# Patient Record
(demographics unavailable — no encounter records)

---

## 2022-02-22 NOTE — CAT SCAN REPORT
CT HEAD WITHOUT CONTRAST



INDICATION : CODE STROKE CALL REPORT [phone] Altered mental status, rightward gaze, focal seizu.




TECHNIQUE:  Axial imaging performed from the skull apex through the skull base without the use of con
trast.  Sagittal and coronal reformatted images.  All CT scans at this location are performed using C
T dose reduction for ALARA by means of automated exposure control. 



COMPARISON:  None



FINDINGS:  



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 There is an approximate 2 x 6
 x 2 x 2 cm acute hemorrhage surrounding the ventricular catheter in the left frontal lobe with surro
unding edema. No other areas of hemorrhage are identified. No convincing acute ischemic infarct. Mild
 nonspecific chronic white matter changes are noted. No chronic infarct.

Ventricles:  Ventricles are normal in size and appear symmetric.   

Bones:  No acute osseous abnormality.   

Sinuses:  Sinuses and mastoid air cells are clear.



Soft tissues:  There is mild soft tissue swelling and hemorrhagic products in the left frontal soft t
issue surrounding the ventriculoperitoneal shunt.  



IMPRESSION: Acute left frontal hemorrhage as described surrounding the left frontal ventricular bacilio
ter.





============================================

CODE STROKE:

Time of Communication (CST/CDT): 1240 hours

Licensed Practitioner Receiving Report: Dr. Bauer received this information for Dr. Gaming who was in
 care of the patient.







============================================











CTA NECK



INDICATION / CLINICAL INFORMATION:

50 years Male; CODE STROKE CALL REPORT [phone] Altered mental status, rightward gaze, focal seiz
u.



TECHNIQUE: Thin cut axial images obtained through the head during IV bolus contrast administration. S
agittal, coronal, and 3 plane MIP reconstructions performed by the technologist. NASCET type criteria
 used evaluate stenoses. All CT scans at this location are performed using CT dose reduction for ALAR
A by means of automated exposure control.



COMPARISON:

None available.



FINDINGS: 



ARCH: Mild atherosclerotic disease.



CAROTID ARTERIES: The common carotid arteries and internal carotid arteries are tortuous. There are m
oderate to severe calcific plaques in the right carotid bulb extending to the proximal right ICA. Mod
erate to severe stenosis is suspected in the proximal right ICA. The remainder of the right ICA is wi
jamarcus patent. There are mild to moderate atherosclerotic plaques in the left carotid bulb with no evid
ence for stenosis in the left carotid system.



VERTEBRAL ARTERIES: Codominant vertebral system seen. No significant stenosis appreciated.



ADDITIONAL FINDINGS: Remainder of the surrounding soft tissues are grossly normal. 



IMPRESSION: Moderate calcific plaques are identified at both carotid bifurcations, more so on the rig
ht side. Hemodynamically significant stenosis is suspected in the proximal right ICA and estimated at
 70-80%. The level of stenosis is difficult to measure due to extensive tortuosity of the vessels. Co
nsider correlation with carotid Doppler ultrasound.







CTA HEAD



INDICATION / CLINICAL INFORMATION:

50 years Male; CODE STROKE CALL REPORT [phone] Altered mental status, rightward gaze, focal seiz
u.



TECHNIQUE: Thin cut axial images obtained through the head during IV bolus contrast administration. S
agittal, coronal, and 3 plane MIP reconstructions performed by the technologist. NASCET type criteria
 used evaluate stenoses. Automated exposure control utilized for radiation reduction purposes.



COMPARISON: 

None available.



FINDINGS:



INTERNAL CAROTID ARTERIES: Mild to moderate atherosclerotic plaques are noted in the distal ICAs but 
no hemodynamically significant stenosis.

VERTEBROBASILAR SYSTEM: No significant narrowing appreciated.



DISTAL BRANCHES: Distal branches of the anterior, middle, and posterior cerebral arteries are fairly 
symmetric in appearance and number. There appears to be previous surgical clipping or coiling in the 
right sylvian fissure which may represent previous aneurysm repair. Please correlate with history.



ANEURYSM: None identified.



ADDITIONAL FINDINGS: Remainder of the surrounding soft tissues are grossly normal. 



IMPRESSION:

No evidence for stenosis or large vessel occlusion. 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
lvian fissure.



Signer Name: Yasmany Carney Jr, MD 

Signed: 2/22/2022 1:47 PM

Workstation Name: [id number]

## 2022-02-22 NOTE — EMERGENCY DEPARTMENT REPORT
HPI





- General


Chief Complaint: Neuro Symptoms/Deficit


Time Seen by Provider: 22 12:28





- HPI


HPI: 





Room 25








The patient is a 50-year-old male present with a chief complaint of altered 

mental status/seizure.  Per EMS the patient was at dialysis when he was found to

be hypertensive.  At 11: 55 the patient stopped responding and had a rightward 

gaze.  Upon EMS arrival the patient was found to have a focal seizure of the 

right face with a rightward gaze.  Per EMS the patient has never responded 

verbally to them.  Upon arrival to the ED the patient localizes with his left 

hand when given a sternal rub but he does not speak as he continues to seize.  

Patient was administered 2 mg of Ativan and within 1 to 2 minutes the seizure 

abated.





ED Past Medical Hx





- Past Medical History


Hx Hypertension: Yes


Hx Kidney Stones: Yes (ESRD)





- Surgical History


Additional Surgical History: Left upper extremity fistula





- Family History


Family history: no significant





- Social History


Smoking Status: Unknown if ever smoked


Substance Use Type: None





ED Review of Systems


ROS: 


Stated complaint: STROKE


Other details as noted in HPI





Comment: Unobtainable due to pts medical conditions





Physical Exam





- Physical Exam


Vital Signs: 


                                   Vital Signs











  22





  12:28


 


Pulse Rate 71


 


Respiratory 18





Rate 


 


Blood Pressure 205/132





[Left] 


 


O2 Sat by Pulse 98





Oximetry 











Physical Exam: 





GENERAL: The patient is well-developed well-nourished male sitting up on 

stretcher exhibiting rightward gaze with twitching of the right side of his 

face. []


HEENT: Normocephalic.  Atraumatic.  Rightward gaze, twitching to the right side 

of the face


NECK: Supple.  Trachea midline


CHEST/LUNGS: Clear to auscultation.  There is no respiratory distress noted.


HEART/CARDIOVASCULAR: Regular.  There is no tachycardia.  There is no gallop rub

 or murmur.


ABDOMEN: Abdomen is soft, nontender.  Patient has normal bowel sounds.  There is

 no abdominal distention.


SKIN: There is no rash.  There is no edema.  There is no diaphoresis.


NEURO: The patient is having a focal seizure of the right face.  Patient does 

not respond verbally but localizes with the left hand when given a sternal rub.


MUSCULOSKELETAL:  There is no evidence of acute injury.





ED Course


                                   Vital Signs











  22





  12:28


 


Pulse Rate 71


 


Respiratory 18





Rate 


 


Blood Pressure 205/132





[Left] 


 


O2 Sat by Pulse 98





Oximetry 














- Reevaluation(s)


Reevaluation #1: 





22 13:50


GCS 8





- Consultations


Consultation #1: 


Case discussed with tele-neurologist-we will follow up after CT


CT discussed with tele-neurologist-recommends Keppra 2 g and transfer to 

hospital with a recent intracranial surgery was performed





22 13:13


[facility] transfer line called


22 13:51


Case discussed with [facility] neuro intensivist Dr. Rodríguez- will accept patient in

 transfer to [facility].  Recommends administering DDAVP 0.3 mcg/kg and keeping 

systolic blood pressure less than 150.


22 14:29








- Intubation


Sedative: Etomidate


Mg Given: 20


Paralytic: Succinylcholine


Mg Given: 100


Laryngoscope: fiberoptic video scope


Size: 3


Assist Device Used: fiberoptic device


ET Tube Size: 8


Tube Secured Depth (cm): 24


Tube Secured Location: lips


Tube Placement Confirmation: visualized tube passing t, equal breath sounds bila

t, no breath sounds over epi, confirmation by capnometr


Patient Tolerated Procedure: no complications


Intubation Complications: none





ED Medical Decision Making





- Lab Data


Result diagrams: 


                                 22 12:43





                                 22 12:43





                                Laboratory Tests











  22





  12:43 12:43 12:43


 


WBC  8.0  


 


RBC  3.78  


 


Hgb  11.1 L  


 


Hct  33.7 L  


 


MCV  89  


 


MCH  30  


 


MCHC  33  


 


RDW  17.9 H  


 


Plt Count  162  


 


Lymph % (Auto)  4.6 L  


 


Mono % (Auto)  10.9 H  


 


Eos % (Auto)  6.9 H  


 


Baso % (Auto)  2.0 H  


 


Lymph # (Auto)  0.4 L  


 


Mono # (Auto)  0.9 H  


 


Eos # (Auto)  0.6 H  


 


Baso # (Auto)  0.2 H  


 


Seg Neutrophils %  75.6 H  


 


Seg Neutrophils #  6.1  


 


PT    15.1 H


 


INR    1.07


 


APTT    33.9


 


Sodium   139 


 


Potassium   4.6 


 


Chloride   96.3 L 


 


Carbon Dioxide   19 L 


 


Anion Gap   28 


 


BUN   79 H 


 


Creatinine   14.6 H 


 


Estimated GFR   4 


 


BUN/Creatinine Ratio   5 


 


Glucose   121 H 


 


Calcium   9.1 


 


Magnesium   2.30 














- Radiology Data


Radiology results: report reviewed (CT head, CTA brain, CTA neck), image 

reviewed (CT head, CTA brain, CTA neck)





[facility] [address] [place] [address] Cat

 Scan Report Signed Patient: LETICIA TERRELL MR#: [id number] : 1971 

Acct:[id number] Age/Sex: 50 / M ADM Date: 22 Loc: ED Attending Dr: 

Ordering Physician: PILAR JASON MD Date of Service: 22 Procedure(s): CT 

head/brain wo con Accession Number(s): [id number] cc: PILAR JASON MD CT HEAD 

WITHOUT CONTRAST INDICATION : CODE STROKE CALL REPORT [phone] Altered 

mental status, rightward gaze, focal seizu. TECHNIQUE: Axial imaging performed 

from the skull apex through the skull base without the use of contrast. Sagittal

 and coronal reformatted images. All CT scans at this location are performed 

using CT dose reduction for ALARA by means of automated exposure control. 

COMPARISON: None FINDINGS: Parenchyma: A left frontal ventricular peritoneal 

shunt is identified. There is an approximate 2 x 6 x 2 x 2 cm acute hemorrhage 

surrounding the ventricular catheter in the left frontal lobe with surrounding 

edema. No other areas of hemorrhage are identified. No convincing acute ischemic

 infarct. Mild nonspecific chronic white matter changes are noted. No chronic 

infarct. Ventricles: Ventricles are normal in size and appear symmetric. Bones: 

No acute osseous abnormality. Sinuses: Sinuses and mastoid air cells are clear. 

Soft tissues: There is mild soft tissue swelling and hemorrhagic products in the

 left frontal soft tissue surrounding the ventriculoperitoneal shunt. 

IMPRESSION: Acute left frontal hemorrhage as described surrounding the left 

frontal ventricular catheter. ============================================ CODE 

STROKE: Time of Communication (CST/CDT): 1240 hours Licensed Practitioner 

Receiving Report: Dr. Bauer received this information for Dr. Jason who was in 

care of the patient. ============================================ CTA NECK 

INDICATION / CLINICAL INFORMATION: 50 years Male; CODE STROKE CALL REPORT 

[phone] Altered mental status, rightward gaze, focal seizu. TECHNIQUE: Thin

 cut axial images obtained through the head during IV bolus contrast 

administration. Sagittal, coronal, and 3 plane MIP reconstructions performed by 

the technologist. NASCET type criteria used evaluate stenoses. All CT scans at 

this location are performed using CT dose reduction for ALARA by means of 

automated exposure control. COMPARISON: None available. FINDINGS: ARCH: Mild 

atherosclerotic disease. CAROTID ARTERIES: The common carotid arteries and 

internal carotid arteries are tortuous. There are moderate to severe calcific 

plaques in the right carotid bulb extending to the proximal right ICA. Moderate 

to severe stenosis is suspected in the proximal right ICA. The remainder of the 

right ICA is widely patent. There are mild to moderate atherosclerotic plaques 

in the left carotid bulb with no evidence for stenosis in the left carotid 

system. VERTEBRAL ARTERIES: Codominant vertebral system seen. No significant 

stenosis appreciated. ADDITIONAL FINDINGS: Remainder of the surrounding soft 

tissues are grossly normal. IMPRESSION: Moderate calcific plaques are identified

 at both carotid bifurcations, more so on the right side. Hemodynamically 

significant stenosis is suspected in the proximal right ICA and estimated at 70-

80%. The level of stenosis is difficult to measure due to extensive tortuosity 

of the vessels. Consider correlation with carotid Doppler ultrasound. CTA HEAD 

INDICATION / CLINICAL INFORMATION: 50 years Male; CODE STROKE CALL REPORT 

[phone] Altered mental status, rightward gaze, focal seizu. TECHNIQUE: Thin

 cut axial images obtained through the head during IV bolus contrast 

administration. Sagittal, coronal, and 3 plane MIP reconstructions performed by 

the technologist. NASCET type criteria used evaluate stenoses. Automated 

exposure control utilized for radiation reduction purposes. COMPARISON: None 

available. FINDINGS: INTERNAL CAROTID ARTERIES: Mild to moderate atherosclerotic

 plaques are noted in the distal ICAs but no hemodynamically significant 

stenosis. VERTEBROBASILAR SYSTEM: No significant narrowing appreciated. DISTAL 

BRANCHES: Distal branches of the anterior, middle, and posterior cerebral 

arteries are fairly symmetric in appearance and number. There appears to be 

previous surgical clipping or coiling in the right sylvian fissure which may 

represent previous aneurysm repair. Please correlate with history. ANEURYSM: 

None identified. ADDITIONAL FINDINGS: Remainder of the surrounding soft tissues 

are grossly normal. IMPRESSION: No evidence for stenosis or large vessel 

occlusion. 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 

Signer Name: Yasmany Carney Jr, MD Signed: 2022 1:47 PM Workstation Name:

 [id number] Transcribed By: TTR Dictated By: YASMANY CARNEY JR, MD 

Electronically Authenticated By: YASMANY CARNEY JR, MD Signed Date/Time: 

22 1347 DD/DT: 22 1330 TD/TT: 


Print Cancel 





[facility] [address] [place] [address] Cat

 Scan Report Signed Patient: LETICIA TERRELL MR#: [id number] : 1971 

Acct:[id number] Age/Sex: 50 / M ADM Date: 22 Loc: ED Attending Dr: 

Ordering Physician: PILAR JASON MD Date of Service: 22 Procedure(s): CT 

angio head Accession Number(s): [id number] cc: PILAR JASON MD CT HEAD WITHOUT 

CONTRAST INDICATION : CODE STROKE CALL REPORT [phone] Altered mental stat

us, rightward gaze, focal seizu. TECHNIQUE: Axial imaging performed from the 

skull apex through the skull base without the use of contrast. Sagittal and 

coronal reformatted images. All CT scans at this location are performed using CT

 dose reduction for ALARA by means of automated exposure control. COMPARISON: 

None FINDINGS: Parenchyma: A left frontal ventricular peritoneal shunt is 

identified. There is an approximate 2 x 6 x 2 x 2 cm acute hemorrhage 

surrounding the ventricular catheter in the left frontal lobe with surrounding 

edema. No other areas of hemorrhage are identified. No convincing acute ischemic

 infarct. Mild nonspecific chronic white matter changes are noted. No chronic 

infarct. Ventricles: Ventricles are normal in size and appear symmetric. Bones: 

No acute osseous abnormality. Sinuses: Sinuses and mastoid air cells are clear. 

Soft tissues: There is mild soft tissue swelling and hemorrhagic products in the

 left frontal soft tissue surrounding the ventriculoperitoneal shunt. 

IMPRESSION: Acute left frontal hemorrhage as described surrounding the left 

frontal ventricular catheter. ============================================ CODE 

STROKE: Time of Communication (CST/CDT): 1240 hours Licensed Practitioner 

Receiving Report: Dr. Bauer received this information for Dr. Jason who was in 

care of the patient. ============================================ CTA NECK 

INDICATION / CLINICAL INFORMATION: 50 years Male; CODE STROKE CALL REPORT 

[phone] Altered mental status, rightward gaze, focal seizu. TECHNIQUE: Thin

 cut axial images obtained through the head during IV bolus contrast 

administration. Sagittal, coronal, and 3 plane MIP reconstructions performed by 

the technologist. NASCET type criteria used evaluate stenoses. All CT scans at 

this location are performed using CT dose reduction for ALARA by means of 

automated exposure control. COMPARISON: None available. FINDINGS: ARCH: Mild 

atherosclerotic disease. CAROTID ARTERIES: The common carotid arteries and inter

nal carotid arteries are tortuous. There are moderate to severe calcific plaques

 in the right carotid bulb extending to the proximal right ICA. Moderate to 

severe stenosis is suspected in the proximal right ICA. The remainder of the 

right ICA is widely patent. There are mild to moderate atherosclerotic plaques 

in the left carotid bulb with no evidence for stenosis in the left carotid 

system. VERTEBRAL ARTERIES: Codominant vertebral system seen. No significant 

stenosis appreciated. ADDITIONAL FINDINGS: Remainder of the surrounding soft 

tissues are grossly normal. IMPRESSION: Moderate calcific plaques are identified

 at both carotid bifurcations, more so on the right side. Hemodynamically signi

ficant stenosis is suspected in the proximal right ICA and estimated at 70-80%. 

The level of stenosis is difficult to measure due to extensive tortuosity of the

 vessels. Consider correlation with carotid Doppler ultrasound. CTA HEAD 

INDICATION / CLINICAL INFORMATION: 50 years Male; CODE STROKE CALL REPORT 

[phone] Altered mental status, rightward gaze, focal seizu. TECHNIQUE: Thin

 cut axial images obtained through the head during IV bolus contrast 

administration. Sagittal, coronal, and 3 plane MIP reconstructions performed by 

the technologist. NASCET type criteria used evaluate stenoses. Automated 

exposure control utilized for radiation reduction purposes. COMPARISON: None 

available. FINDINGS: INTERNAL CAROTID ARTERIES: Mild to moderate atherosclerotic

 plaques are noted in the distal ICAs but no hemodynamically significant 

stenosis. VERTEBROBASILAR SYSTEM: No significant narrowing appreciated. DISTAL 

BRANCHES: Distal branches of the anterior, middle, and posterior cerebral 

arteries are fairly symmetric in appearance and number. There appears to be 

previous surgical clipping or coiling in the right sylvian fissure which may 

represent previous aneurysm repair. Please correlate with history. ANEURYSM: 

None identified. ADDITIONAL FINDINGS: Remainder of the surrounding soft tissues 

are grossly normal. IMPRESSION: No evidence for stenosis or large vessel 

occlusion. 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 

Signer Name: Yasmany Carney Jr, MD Signed: 2022 1:47 PM Workstation Name:

 [id number] Transcribed By: TTR Dictated By: YASMANY CARNEY JR, MD 

Electronically Authenticated By: YASMANY CARNEY JR, MD Signed Date/Time: 

22 1347 DD/DT: 22 1330 TD/TT: 


Print Cancel 





[facility] [address] [place] [address] Cat

 Scan Report Signed Patient: LETICIA TERRELL MR#: [id number] : 1971 

Acct:[id number] Age/Sex: 50 / M ADM Date: 22 Loc: ED Attending Dr: Manohar goode Physician: PILAR JASON MD Date of Service: 22 Procedure(s): CT 

angio neck Accession Number(s): [id number] cc: PILAR JASON MD CT HEAD WITHOUT 

CONTRAST INDICATION : CODE STROKE CALL REPORT [phone] Altered mental 

status, rightward gaze, focal seizu. TECHNIQUE: Axial imaging performed from the

 skull apex through the skull base without the use of contrast. Sagittal and 

coronal reformatted images. All CT scans at this location are performed using CT

 dose reduction for ALARA by means of automated exposure control. COMPARISON: 

None FINDINGS: Parenchyma: A left frontal ventricular peritoneal shunt is 

identified. There is an approximate 2 x 6 x 2 x 2 cm acute hemorrhage 

surrounding the ventricular catheter in the left frontal lobe with surrounding 

edema. No other areas of hemorrhage are identified. No convincing acute ischemic

 infarct. Mild nonspecific chronic white matter changes are noted. No chronic 

infarct. Ventricles: Ventricles are normal in size and appear symmetric. Bones: 

No acute osseous abnormality. Sinuses: Sinuses and mastoid air cells are clear. 

Soft tissues: There is mild soft tissue swelling and hemorrhagic products in the

 left frontal soft tissue surrounding the ventriculoperitoneal shunt. 

IMPRESSION: Acute left frontal hemorrhage as described surrounding the left 

frontal ventricular catheter. ============================================ CODE 

STROKE: Time of Communication (CST/CDT): 1240 hours Licensed Practitioner 

Receiving Report: Dr. Bauer received this information for Dr. Jason who was in 

care of the patient. ============================================ CTA NECK 

INDICATION / CLINICAL INFORMATION: 50 years Male; CODE STROKE CALL REPORT 

[phone] Altered mental status, rightward gaze, focal seizu. TECHNIQUE: Thin

 cut axial images obtained through the head during IV bolus contrast 

administration. Sagittal, coronal, and 3 plane MIP reconstructions performed by 

the technologist. NASCET type criteria used evaluate stenoses. All CT scans at t

his location are performed using CT dose reduction for ALARA by means of 

automated exposure control. COMPARISON: None available. FINDINGS: ARCH: Mild 

atherosclerotic disease. CAROTID ARTERIES: The common carotid arteries and 

internal carotid arteries are tortuous. There are moderate to severe calcific 

plaques in the right carotid bulb extending to the proximal right ICA. Moderate 

to severe stenosis is suspected in the proximal right ICA. The remainder of the 

right ICA is widely patent. There are mild to moderate atherosclerotic plaques 

in the left carotid bulb with no evidence for stenosis in the left carotid 

system. VERTEBRAL ARTERIES: Codominant vertebral system seen. No significant 

stenosis appreciated. ADDITIONAL FINDINGS: Remainder of the surrounding soft 

tissues are grossly normal. IMPRESSION: Moderate calcific plaques are identified

 at both carotid bifurcations, more so on the right side. Hemodynamically 

significant stenosis is suspected in the proximal right ICA and estimated at 70-

80%. The level of stenosis is difficult to measure due to extensive tortuosity 

of the vessels. Consider correlation with carotid Doppler ultrasound. CTA HEAD 

INDICATION / CLINICAL INFORMATION: 50 years Male; CODE STROKE CALL REPORT 

[phone] Altered mental status, rightward gaze, focal seizu. TECHNIQUE: Thin

 cut axial images obtained through the head during IV bolus contrast 

administration. Sagittal, coronal, and 3 plane MIP reconstructions performed by 

the technologist. NASCET type criteria used evaluate stenoses. Automated 

exposure control utilized for radiation reduction purposes. COMPARISON: None 

available. FINDINGS: INTERNAL CAROTID ARTERIES: Mild to moderate atherosclerotic

 plaques are noted in the distal ICAs but no hemodynamically significant 

stenosis. VERTEBROBASILAR SYSTEM: No significant narrowing appreciated. DISTAL 

BRANCHES: Distal branches of the anterior, middle, and posterior cerebral 

arteries are fairly symmetric in appearance and number. There appears to be pr

evious surgical clipping or coiling in the right sylvian fissure which may 

represent previous aneurysm repair. Please correlate with history. ANEURYSM: 

None identified. ADDITIONAL FINDINGS: Remainder of the surrounding soft tissues 

are grossly normal. IMPRESSION: No evidence for stenosis or large vessel 

occlusion. 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 

Signer Name: Yasmany Carney Jr, MD Signed: 2022 1:47 PM Workstation Name:

 [id number] Transcribed By: TTR Dictated By: YASMANY CARNEY JR, MD 

Electronically Authenticated By: YASMANY CARNEY JR, MD Signed Date/Time: 

22 1347 DD/DT: 22 1330 TD/TT: 


Print Cancel 





- Medical Decision Making





Patient intubated to secure airway for transport





- Differential Diagnosis


Seizure, ICH, hypertensive emergency


Critical Care Time: Yes


Critical care time in (mins) excluding proc time.: 30


Critical care attestation.: 


If time is entered above; I have spent that time in minutes in the direct care 

of this critically ill patient, excluding procedure time.








ED Disposition


Clinical Impression: 


 Intracranial hemorrhage, Seizure, Hypertensive emergency





Disposition: [facility] HOSPICE/MEDICAL FACILITY


Is pt being admited?: No


Does the pt Need Aspirin: No


Condition: Serious


Instructions:  Hypertension (ED)


Time of Disposition: 14:25 (Awaiting transport)

## 2022-02-22 NOTE — CONSULTATION
History of Present Illness


History of present illness: 





[facility] Consult Note





# Demographics


Consult Type: Acute Stroke Level 1 (0-4.5 hrs)





Patient Location: Emergency Room





First Name: Gaurav





Last Name: Marco





YOB: 1971





Age: 50





Gender: Male





Facility: [facility]





Time of Initial Page (Eastern Time): 02/22/2022, 12:21





Time of Return Call (Eastern Time): 02/22/2022, 12:21








# HPI


Chief Complaint:


seizure





History: with ESRD on HD, presents from dialysis when he had a rightward gaze. 

On arrival to ED, convulsing with persistent rightward gaze. /130








# Scores


Time of exam and NIHSS (Eastern Time): 02/22/2022, 12:28





Level of Consciousness 1a: [0] = Alert; keenly responsive





LOC Questions 1b: [2] = Answers neither correctly





LOC Commands 1c: [1] = Performs one correctly





Best Gaze 2: [1] = Partial gaze palsy





Visual 3: [0] = No visual loss





Facial Palsy 4: [0] = Normal symmetrical movements





Motor Arm Left 5a: [0] = No drift





Motor Arm Right 5b: [0] = No drift





Motor Leg Left 6a: [1] = Drift





Motor Leg Right 6b: [1] = Drift





Limb Ataxia 7: [0] = Absent





Sensory 8: [0] = Normal





Best Language 9: [2] = Severe aphasia





Dysarthria 10: [2] = Severe dysarthria





Extinction and Inattention 11: [0] = No abnormality





NIHSS Total: 10








# Data


Time Head CT personally read by me (Eastern Time): 02/22/2022, 13:03





Head CT:


hemorrhage


preliminarily reviewed by me, please refer to radiology read for official 

reading


left frontal hemorrhage surrounding VPS catheter








# Assessment


Impression:


Intracerebral hemorrhage


Seizure








# Plan


Thrombolytic/Intervention: NOT IV Thrombolysis or IA Intervention candidate





Thrombolytic Exclusion (< 3 hour window):


ICH





Intraarterial Exclusion:


ICH





Target Blood Pressure: SBP < 160





Medication:


Levetiracetam 2g





Other:


consult neurosurgery


I have discussed my recommendations with the referring provider





Additional Recommendations: Given recent surgery, should try to transfer to 

hospital where this was performed





Disposition: transfer








# Logistics


Telemedicine: Interactive 2 way audio and visual telecommunication technology 

was utilized during this visit








Electronically signed at 02/22/2022 13:12 (Eastern Time) by Pablo Saravia MD





Medications and Allergies


                                    Allergies











Allergy/AdvReac Type Severity Reaction Status Date / Time


 


promethazine [From Phenergan] AdvReac  Unknown Verified 02/22/22 12:32











Active Meds: 


Active Medications





Levetiracetam (Keppra 1,000 Mg/Ns 0.75% 100ml)  1,000 mg in 100 mls @ 400 mls/hr

IV ONCE@1230 SYDNEE


   Stop: 02/22/22 16:00


Lorazepam (Lorazepam 2 Mg/Ml Vial)  2 mg IV ONCE@1230 SYDNEE


   Stop: 02/22/22 15:00











Physical Examination





- Vital Signs


Vital Signs: 


                                   Vital Signs











Pulse Resp BP Pulse Ox


 


 71   18   205/132   98 


 


 02/22/22 12:28  02/22/22 12:28  02/22/22 12:28  02/22/22 12:28














Results





- Laboratory Findings


CBC and BMP: 


                                 02/22/22 12:43





Abnormal Lab Findings: 


                                  Abnormal Labs











  02/22/22





  12:43


 


Hgb  11.1 L


 


Hct  33.7 L


 


RDW  17.9 H


 


Lymph % (Auto)  4.6 L


 


Mono % (Auto)  10.9 H


 


Eos % (Auto)  6.9 H


 


Baso % (Auto)  2.0 H


 


Lymph # (Auto)  0.4 L


 


Mono # (Auto)  0.9 H


 


Eos # (Auto)  0.6 H


 


Baso # (Auto)  0.2 H


 


Seg Neutrophils %  75.6 H

## 2022-02-22 NOTE — CAT SCAN REPORT
CT HEAD WITHOUT CONTRAST



INDICATION : CODE STROKE CALL REPORT [phone] Altered mental status, rightward gaze, focal seizu.




TECHNIQUE:  Axial imaging performed from the skull apex through the skull base without the use of con
trast.  Sagittal and coronal reformatted images.  All CT scans at this location are performed using C
T dose reduction for ALARA by means of automated exposure control. 



COMPARISON:  None



FINDINGS:  



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 There is an approximate 2 x 6
 x 2 x 2 cm acute hemorrhage surrounding the ventricular catheter in the left frontal lobe with surro
unding edema. No other areas of hemorrhage are identified. No convincing acute ischemic infarct. Mild
 nonspecific chronic white matter changes are noted. No chronic infarct.

Ventricles:  Ventricles are normal in size and appear symmetric.   

Bones:  No acute osseous abnormality.   

Sinuses:  Sinuses and mastoid air cells are clear.



Soft tissues:  There is mild soft tissue swelling and hemorrhagic products in the left frontal soft t
issue surrounding the ventriculoperitoneal shunt.  



IMPRESSION: Acute left frontal hemorrhage as described surrounding the left frontal ventricular bacilio
ter.





============================================

CODE STROKE:

Time of Communication (CST/CDT): 1240 hours

Licensed Practitioner Receiving Report: Dr. Bauer received this information for Dr. Gaming who was in
 care of the patient.







============================================











CTA NECK



INDICATION / CLINICAL INFORMATION:

50 years Male; CODE STROKE CALL REPORT [phone] Altered mental status, rightward gaze, focal seiz
u.



TECHNIQUE: Thin cut axial images obtained through the head during IV bolus contrast administration. S
agittal, coronal, and 3 plane MIP reconstructions performed by the technologist. NASCET type criteria
 used evaluate stenoses. All CT scans at this location are performed using CT dose reduction for ALAR
A by means of automated exposure control.



COMPARISON:

None available.



FINDINGS: 



ARCH: Mild atherosclerotic disease.



CAROTID ARTERIES: The common carotid arteries and internal carotid arteries are tortuous. There are m
oderate to severe calcific plaques in the right carotid bulb extending to the proximal right ICA. Mod
erate to severe stenosis is suspected in the proximal right ICA. The remainder of the right ICA is wi
jamarcus patent. There are mild to moderate atherosclerotic plaques in the left carotid bulb with no evid
ence for stenosis in the left carotid system.



VERTEBRAL ARTERIES: Codominant vertebral system seen. No significant stenosis appreciated.



ADDITIONAL FINDINGS: Remainder of the surrounding soft tissues are grossly normal. 



IMPRESSION: Moderate calcific plaques are identified at both carotid bifurcations, more so on the rig
ht side. Hemodynamically significant stenosis is suspected in the proximal right ICA and estimated at
 70-80%. The level of stenosis is difficult to measure due to extensive tortuosity of the vessels. Co
nsider correlation with carotid Doppler ultrasound.







CTA HEAD



INDICATION / CLINICAL INFORMATION:

50 years Male; CODE STROKE CALL REPORT [phone] Altered mental status, rightward gaze, focal seiz
u.



TECHNIQUE: Thin cut axial images obtained through the head during IV bolus contrast administration. S
agittal, coronal, and 3 plane MIP reconstructions performed by the technologist. NASCET type criteria
 used evaluate stenoses. Automated exposure control utilized for radiation reduction purposes.



COMPARISON: 

None available.



FINDINGS:



INTERNAL CAROTID ARTERIES: Mild to moderate atherosclerotic plaques are noted in the distal ICAs but 
no hemodynamically significant stenosis.

VERTEBROBASILAR SYSTEM: No significant narrowing appreciated.



DISTAL BRANCHES: Distal branches of the anterior, middle, and posterior cerebral arteries are fairly 
symmetric in appearance and number. There appears to be previous surgical clipping or coiling in the 
right sylvian fissure which may represent previous aneurysm repair. Please correlate with history.



ANEURYSM: None identified.



ADDITIONAL FINDINGS: Remainder of the surrounding soft tissues are grossly normal. 



IMPRESSION:

No evidence for stenosis or large vessel occlusion. 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
lvian fissure.



Signer Name: Yasmany Carney Jr, MD 

Signed: 2/22/2022 1:47 PM

Workstation Name: [id number]

## 2022-02-22 NOTE — CAT SCAN REPORT
CT HEAD WITHOUT CONTRAST



INDICATION : CODE STROKE CALL REPORT [phone] Altered mental status, rightward gaze, focal seizu.




TECHNIQUE:  Axial imaging performed from the skull apex through the skull base without the use of con
trast.  Sagittal and coronal reformatted images.  All CT scans at this location are performed using C
T dose reduction for ALARA by means of automated exposure control. 



COMPARISON:  None



FINDINGS:  



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 There is an approximate 2 x 6
 x 2 x 2 cm acute hemorrhage surrounding the ventricular catheter in the left frontal lobe with surro
unding edema. No other areas of hemorrhage are identified. No convincing acute ischemic infarct. Mild
 nonspecific chronic white matter changes are noted. No chronic infarct.

Ventricles:  Ventricles are normal in size and appear symmetric.   

Bones:  No acute osseous abnormality.   

Sinuses:  Sinuses and mastoid air cells are clear.



Soft tissues:  There is mild soft tissue swelling and hemorrhagic products in the left frontal soft t
issue surrounding the ventriculoperitoneal shunt.  



IMPRESSION: Acute left frontal hemorrhage as described surrounding the left frontal ventricular bacilio
ter.





============================================

CODE STROKE:

Time of Communication (CST/CDT): 1240 hours

Licensed Practitioner Receiving Report: Dr. Bauer received this information for Dr. Gaming who was in
 care of the patient.







============================================











CTA NECK



INDICATION / CLINICAL INFORMATION:

50 years Male; CODE STROKE CALL REPORT [phone] Altered mental status, rightward gaze, focal seiz
u.



TECHNIQUE: Thin cut axial images obtained through the head during IV bolus contrast administration. S
agittal, coronal, and 3 plane MIP reconstructions performed by the technologist. NASCET type criteria
 used evaluate stenoses. All CT scans at this location are performed using CT dose reduction for ALAR
A by means of automated exposure control.



COMPARISON:

None available.



FINDINGS: 



ARCH: Mild atherosclerotic disease.



CAROTID ARTERIES: The common carotid arteries and internal carotid arteries are tortuous. There are m
oderate to severe calcific plaques in the right carotid bulb extending to the proximal right ICA. Mod
erate to severe stenosis is suspected in the proximal right ICA. The remainder of the right ICA is wi
jamarcus patent. There are mild to moderate atherosclerotic plaques in the left carotid bulb with no evid
ence for stenosis in the left carotid system.



VERTEBRAL ARTERIES: Codominant vertebral system seen. No significant stenosis appreciated.



ADDITIONAL FINDINGS: Remainder of the surrounding soft tissues are grossly normal. 



IMPRESSION: Moderate calcific plaques are identified at both carotid bifurcations, more so on the rig
ht side. Hemodynamically significant stenosis is suspected in the proximal right ICA and estimated at
 70-80%. The level of stenosis is difficult to measure due to extensive tortuosity of the vessels. Co
nsider correlation with carotid Doppler ultrasound.







CTA HEAD



INDICATION / CLINICAL INFORMATION:

50 years Male; CODE STROKE CALL REPORT [phone] Altered mental status, rightward gaze, focal seiz
u.



TECHNIQUE: Thin cut axial images obtained through the head during IV bolus contrast administration. S
agittal, coronal, and 3 plane MIP reconstructions performed by the technologist. NASCET type criteria
 used evaluate stenoses. Automated exposure control utilized for radiation reduction purposes.



COMPARISON: 

None available.



FINDINGS:



INTERNAL CAROTID ARTERIES: Mild to moderate atherosclerotic plaques are noted in the distal ICAs but 
no hemodynamically significant stenosis.

VERTEBROBASILAR SYSTEM: No significant narrowing appreciated.



DISTAL BRANCHES: Distal branches of the anterior, middle, and posterior cerebral arteries are fairly 
symmetric in appearance and number. There appears to be previous surgical clipping or coiling in the 
right sylvian fissure which may represent previous aneurysm repair. Please correlate with history.



ANEURYSM: None identified.



ADDITIONAL FINDINGS: Remainder of the surrounding soft tissues are grossly normal. 



IMPRESSION:

No evidence for stenosis or large vessel occlusion. 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
lvian fissure.



Signer Name: Yasmany Carney Jr, MD 

Signed: 2/22/2022 1:47 PM

Workstation Name: [id number]

## 2022-03-05 NOTE — EMERGENCY DEPARTMENT REPORT
ED General Adult HPI





- General


Chief complaint: Weakness


Stated complaint: I missed dialysis, and I just want to make sure that I am okay


Time Seen by Provider: 03/05/22 15:38


Source: patient, RN notes reviewed, old records reviewed


Mode of arrival: Ambulatory


Limitations: No Limitations





- History of Present Illness


Initial comments: 





This patient is a 50-year-old gentleman, who presents to the ER with a primary 

complaint of missing hemodialysis today, and seeking reassurance.





His nephrologist is Dr. Ovalles





The patient received hemodialysis Tuesday, Thursday, Saturday.  He was last 

dialyzed on Thursday.  He reports that he was recently switched to sevelamer and

this occasionally causes him to have cramping and "abdominal issues."  He 

currently denies headache, neck pain, chest pain, abdominal pain, shortness of 

breath, nausea, vomiting, diarrhea, urinary symptoms.  He reports that he feels 

like he is at his baseline, and is presenting primarily for the purposes of 

reassurance.





He was dialyzed this past Thursday, 2 days ago, and reports it was his normal 

length in duration


-: Sudden


Improves with: none


Worsens with: none


Associated Symptoms: denies other symptoms





- Related Data


                                    Allergies











Allergy/AdvReac Type Severity Reaction Status Date / Time


 


promethazine [From Phenergan] AdvReac  Unknown Verified 02/22/22 14:35














ED Review of Systems


ROS: 


Stated complaint: MISSED DIALYSIS/ALTER MENTAL


Other details as noted in HPI





Comment: All other systems reviewed and negative





ED Past Medical Hx





- Past Medical History


Hx Hypertension: Yes


Hx Kidney Stones: Yes (ESRD)





- Surgical History


Additional Surgical History: Left upper extremity fistula





- Social History


Smoking Status: Unknown if ever smoked


Substance Use Type: None





ED Physical Exam





- General


Limitations: No Limitations


General appearance: alert, in no apparent distress





- Head


Head exam: Present: atraumatic, normocephalic





- Eye


Eye exam: Present: normal appearance, EOMI.  Absent: nystagmus





- ENT


ENT exam: Present: normal exam, normal orophraynx, mucous membranes moist, 

normal external ear exam





- Neck


Neck exam: Present: normal inspection, full ROM.  Absent: tenderness, 

meningismus





- Respiratory


Respiratory exam: Present: normal lung sounds bilaterally.  Absent: respiratory 

distress, wheezes, rales, rhonchi, stridor, decreased breath sounds





- Cardiovascular


Cardiovascular Exam: Present: regular rate, normal rhythm, normal heart sounds. 

Absent: bradycardia, tachycardia, irregular rhythm, systolic murmur, diastolic 

murmur, rubs, gallop





- GI/Abdominal


GI/Abdominal exam: Present: soft.  Absent: distended, tenderness, guarding, 

rebound, rigid, pulsatile mass





- Rectal


Rectal exam: Present: deferred





- Extremities Exam


Extremities exam: Present: normal inspection (Left upper extremity fistula 

noted, without redness, pus, streaking.  There is an appropriate thrill.), full 

ROM, pedal edema, other (2+ pulses noted in the bilateral upper and lower 

extremities.  There is no palpable cord.   negative Homans sign.  Muscular 

compartments are soft.  The pelvis is stable.).  Absent: calf tenderness





- Back Exam


Back exam: Present: normal inspection, full ROM.  Absent: tenderness, CVA 

tenderness (R), CVA tenderness (L), paraspinal tenderness, vertebral tenderness





- Neurological Exam


Neurological exam: Present: alert, oriented X3, normal gait, other (No facial 

droop.  Tongue midline.  Extraocular movements intact bilaterally.  Facial 

sensation intact to light touch in V1, V2, V3 distribution bilaterally.  5 and a

5 strength in 4 extremities.  Sensation intact to light touch in 4 

extremities.).  Absent: motor sensory deficit





- Psychiatric


Psychiatric exam: Present: normal affect, normal mood





- Skin


Skin exam: Present: warm, dry, intact, normal color.  Absent: rash





ED Course


                                   Vital Signs











  03/05/22





  15:05


 


Temperature 97.9 F


 


Pulse Rate 66


 


Respiratory 20





Rate 


 


Blood Pressure 159/97


 


O2 Sat by Pulse 98





Oximetry 














ED Medical Decision Making





- Lab Data


Result diagrams: 


                                 03/05/22 15:57





                                 03/05/22 15:57








                                   Vital Signs











  03/05/22





  15:05


 


Temperature 97.9 F


 


Pulse Rate 66


 


Respiratory 20





Rate 


 


Blood Pressure 159/97


 


O2 Sat by Pulse 98





Oximetry 











                                   Lab Results











  03/05/22 03/05/22 03/05/22 Range/Units





  15:57 15:57 15:57 


 


WBC  8.0    (4.5-11.0)  K/mm3


 


RBC  3.49 L    (3.65-5.03)  M/mm3


 


Hgb  10.4 L    (11.8-15.2)  gm/dl


 


Hct  31.2 L    (35.5-45.6)  %


 


MCV  89    (84-94)  fl


 


MCH  30    (28-32)  pg


 


MCHC  33    (32-34)  %


 


RDW  17.3 H    (13.2-15.2)  %


 


Plt Count  136 L    (140-440)  K/mm3


 


PT   14.2   (12.2-14.9)  Sec.


 


INR   0.99   (0.87-1.13)  


 


Sodium    138  (137-145)  mmol/L


 


Potassium    4.8  (3.6-5.0)  mmol/L


 


Chloride    95.3 L  ()  mmol/L


 


Carbon Dioxide    23  (22-30)  mmol/L


 


Anion Gap    25  mmol/L


 


BUN    68 H  (9-20)  mg/dL


 


Creatinine    12.3 H  (0.8-1.3)  mg/dL


 


Estimated GFR    5  ml/min


 


BUN/Creatinine Ratio    6  %


 


Glucose    85  ()  mg/dL


 


Calcium    9.1  (8.4-10.2)  mg/dL














- Medical Decision Making





Differential diagnosis, including but not limited to: End-stage renal disease on

hemodialysis, encounter for medical screening examination





Assessment and plan: 50-year-old gentleman, who was afebrile, with reassuring 

vital signs, clinically sober, with a GCS of 15, presenting today with a primary

complaint of medical clearance after accidentally missing hemodialysis earlier 

on today.  His recent ER visit is reviewed and appreciated.  He denies headache,

at seizure, altered mental status and encephalopathy.  He denies all physical 

complaints at this time.  He is awake, alert, oriented, sober of sound mind, 

drinking soda and eating food, and in no acute distress.  His physical 

examination is essentially unremarkable.  Laboratory studies today are reviewed 

and appreciated.  Patient does not require emergent hemodialysis at this time.  

As a courtesy, we will contact covering nephrology, and discussed the patient's 

case with covering nephrology.  Patient at this point time may be discharged to 

follow-up with outpatient primary care and/or nephrology.  Hopefully, his 

outpatient nephrologist can arrange for hemodialysis this Monday.  Return 

precautions are reviewed.  Patient endorses understanding











I also discussed the patient's history, physical and laboratory studies with 

covering nephrology Dr. Awan.  He will attempt to arrange for hemodialysis ch

air time on Monday morning, but also advises that the patient should call his 

dialysis center Monday morning to see if they can accommodate an extra chair 

time, patient may return to the emergency room if he worsens, but Dr. Awan 

also advises that it should be acceptable for the patient to wait until Tuesday,

should he not be able to obtain chair time on Monday.


Critical care attestation.: 


If time is entered above; I have spent that time in minutes in the direct care 

of this critically ill patient, excluding procedure time.








ED Disposition


Clinical Impression: 


 End stage renal disease, Missed dialysis





Disposition: 01 HOME / SELF CARE / HOMELESS


Is pt being admited?: No


Does the pt Need Aspirin: No


Condition: Good


Instructions:  Dialysis


Additional Instructions: 


Please continue current outpatient medications.  Please follow-up with your 

outpatient kidney doctor/nephrologist within 2 to 3 days for repeat checkup and 

evaluation.





Avoid consumption of Motrin, ibuprofen, Naprosyn, Aleve, alcohol.





Please contact your hemodialysis facility first thing Monday morning, and ask if

they have an extra chair time that can accommodate you.  If so, please go to 

that hemodialysis session on Monday morning.  If not having any symptoms or 

additional complaints, and not able to obtain chair time Monday morning, please 

follow-up first thing on Tuesday morning for routinely scheduled hemodialysis 

chair time.











Please return to the emergency room right away with new pain, worsened pain, 

migration of pain, projectile vomiting, change in mental status, confusion, 

inability tolerate liquid feeds, new, worsened or different symptoms not present

on the initial emergency room evaluation


Referrals: 


TYREE BENÍTEZ MD [Staff Physician] - 3-5 Days

## 2022-03-07 NOTE — CAT SCAN REPORT
CT BRAIN: 3/7/2022



INDICATION / CLINICAL INFORMATION:

 shunt headache hx of ICH.



COMPARISON: 

CT brain 2/22/2022



FINDINGS:



BRAIN/INTRACRANIAL STRUCTURES: Unenhanced CT images of the brain were obtained and compared to the pr
ior exam from 2/22/2022.



Again seen is the left frontal shunt tube, which extends through left frontal edema and hemorrhagic c
hange. The hemorrhagic area associated along the course of the shunt tube in the left frontal lobe is
 of somewhat decreased density compared to the prior exam, although this density is somewhat larger a
nd overall extent, now with a maximum length of 3.2 cm as compared to 2.6 cm. This is likely related 
to normal evolutionary changes in the previous hemorrhage. The overall extent of surrounding vasogeni
c edema is also slightly increased.



The metallic densities in the right occipital scalp and calvarium, including the region of the right 
sigmoid sinus are again noted, presumably due to prior injury or treatment.







EXTRACRANIAL STRUCTURES: Unremarkable.







IMPRESSION:

 Hemorrhagic and edematous changes along the course of the left frontal shunt tube, and pattern most 
likely due to expected evolutionary changes compared to 2/22/2022.









All CT scans at this location are performed using dose reduction to ALARA by means of automated expos
ure control.



Signer Name: Manjit Zarate MD 

Signed: 3/7/2022 9:01 AM

Workstation Name: [id number]

## 2022-03-07 NOTE — XRAY REPORT
CHEST 1 VIEW 



INDICATION / CLINICAL INFORMATION: chest pain. 



COMPARISON: None available.



FINDINGS:



SUPPORT DEVICES: Possible  shunt tubing left chest not well visualized within the mediastinum or be
low.

HEART / MEDIASTINUM: Heart upper limits of normal in size. 

LUNGS / PLEURA: No significant pulmonary or pleural abnormality. No pneumothorax. 



ADDITIONAL FINDINGS: Patient rotated to the right.



IMPRESSION:

1. No active cardiopulmonary disease.



Signer Name: Tay Villanueva II, MD 

Signed: 3/7/2022 5:33 AM

Workstation Name: [id number]

## 2022-03-07 NOTE — CONSULTATION
History of Present Illness





- Reason for Consult


Consult date: 03/07/22


end stage renal disease





- History of Present Illness


The patient with ESRD on HD was admitted for worsening chest pain and cardiology

evaluation. CT of the head wasnegative for acute intracranial process. Chest X-

ray was negative. Renal consult was requested for management of HD while 

inpatient








Past History


Past Medical History: ESRD, hypertension





Medications and Allergies


                                    Allergies











Allergy/AdvReac Type Severity Reaction Status Date / Time


 


promethazine [From Phenergan] Allergy  Nausea Verified 03/07/22 04:43











                                Home Medications











 Medication  Instructions  Recorded  Confirmed  Last Taken  Type


 


Albuterol Sulfate [Proventil Hfa] 2 puff PO Q6H PRN 03/07/22 03/07/22 Unknown 

History


 


AtorvaSTATin [Lipitor] 40 mg PO QHS 03/07/22 03/07/22 Unknown History


 


Hydralazine HCl 100 mg PO Q8H 03/07/22 03/07/22 Unknown History


 


Labetalol HCl [Labetalol 300mg TAB] 600 mg PO Q8H 03/07/22 03/07/22 Unknown 

History


 


NIFEdipine [Nifedipine ER] 90 mg PO QDAY 03/07/22 03/07/22 Unknown History


 


Sevelamer Carbonate [Renvela] 1,600 mg PO TIDWM 03/07/22 03/07/22 Unknown 

History


 


Torsemide [Demadex] 100 mg PO QDAY 03/07/22 03/07/22 Unknown History


 


Warfarin [Coumadin] 5 mg PO QDAY 03/07/22 03/07/22 Unknown History


 


amLODIPine [Norvasc] 10 mg PO DAILY 03/07/22 03/07/22 Unknown History


 


hydrOXYzine PAMOATE [Vistaril] 25 mg PO Q6HR 03/07/22 03/07/22 Unknown History


 


levETIRAcetam [Keppra TAB] 250 mg PO 3XW 03/07/22 03/07/22 Unknown History


 


levETIRAcetam [Keppra TAB] 500 mg PO Q12H 03/07/22 03/07/22 Unknown History











Active Meds: 


Active Medications





Acetaminophen (Acetaminophen 325 Mg Tab)  650 mg PO Q4H PRN


   PRN Reason: Pain MILD(1-3)/Fever >100.5/HA


Heparin Sodium (Porcine) (Heparin 5,000 Unit/1 Ml Vial)  5,000 unit SUB-Q Q12HR 

SYDNEE


Morphine Sulfate (Morphine 4 Mg/1 Ml Inj)  4 mg IV Q4H PRN


   PRN Reason: Pain , Severe (7-10)


Naloxone HCl (Naloxone 0.4 Mg/1 Ml Inj)  0.1 mg IV Q2MIN PRN


   PRN Reason: Res Rate </= 8 or 02 SAT < 92%


Ondansetron HCl (Ondansetron 4 Mg/2 Ml Inj)  4 mg IV Q8H PRN


   PRN Reason: Nausea And Vomiting


Oxycodone/Acetaminophen (Oxycodone /Acetaminophen 5-325mg Tab)  1 tab PO Q6H PRN


   PRN Reason: Pain, Moderate (4-6)


Sodium Chloride (Sodium Chloride 0.9% 10 Ml Flush Syringe)  10 ml IV BID SYDNEE


Sodium Chloride (Sodium Chloride 0.9% 10 Ml Flush Syringe)  10 ml IV PRN PRN


   PRN Reason: LINE FLUSH











Review of Systems


All systems: negative (chest pain)





Exam





- Vital Signs


Vital signs: 


                                   Vital Signs











Temp Pulse Resp BP Pulse Ox


 


 98.7 F   70   18   157/92   100 


 


 03/07/22 04:39  03/07/22 04:39  03/07/22 04:39  03/07/22 04:39  03/07/22 04:39














Results





- Lab Results





                                 03/07/22 05:10





                                 03/07/22 05:10


                             Most recent lab results











Calcium  8.8 mg/dL (8.4-10.2)   03/07/22  05:10    














Assessment and Plan


ESRD on HD


Chest Pain


headaches








HD today for clearance and volume removal


Pt is agreeable with HD while inpatient


will assess HD needs daily


Renally dose meds


Strict I&O

## 2022-03-07 NOTE — CONSULTATION
History of Present Illness


Consult date: 03/07/22


Consult reason: chest pain


History of present illness: 





The patient is a 50-year-old man with end-stage renal disease on hemodialysis 

and chronic hypertension.  No prior cardiac history.  A month ago, he suffered a

hemorrhagic CVA associated with uncontrolled hypertension.  He was treated at 

[facility] and ultimately had a  shunt running down the left side of the neck.  He 

presents now, with pain along the course of the  shunt from the neck into the 

chest.  His pain is associated with movements of the head and neck.  There is no

induration, and no unusual tenderness.  In the emergency room, ECG was normal 

sinus rhythm with right bundle branch block, no acute ST or T wave changes.  

However, the troponin level was mildly elevated at 0.18, in the setting of end-

stage renal disease with a creatinine of 14.  Cardiac consultation was requested

for cardiac assessment.





Patient is currently comfortable, says he feels fine and would like to be 

discharged from the emergency room.  His chest x-ray here showed borderline 

cardiomegaly and clear lungs.





Past History


Past Medical History: ESRD, hypertension, stroke





Medications and Allergies


                                    Allergies











Allergy/AdvReac Type Severity Reaction Status Date / Time


 


promethazine [From Phenergan] Allergy  Nausea Verified 03/07/22 04:43











                                Home Medications











 Medication  Instructions  Recorded  Confirmed  Last Taken  Type


 


Albuterol Sulfate [Proventil Hfa] 2 puff PO Q6H PRN 03/07/22 03/07/22 Unknown 

History


 


AtorvaSTATin [Lipitor] 40 mg PO QHS 03/07/22 03/07/22 Unknown History


 


Hydralazine HCl 100 mg PO Q8H 03/07/22 03/07/22 Unknown History


 


Labetalol HCl [Labetalol 300mg TAB] 600 mg PO Q8H 03/07/22 03/07/22 Unknown 

History


 


NIFEdipine [Nifedipine ER] 90 mg PO QDAY 03/07/22 03/07/22 Unknown History


 


Sevelamer Carbonate [Renvela] 1,600 mg PO TIDWM 03/07/22 03/07/22 Unknown 

History


 


Torsemide [Demadex] 100 mg PO QDAY 03/07/22 03/07/22 Unknown History


 


Warfarin [Coumadin] 5 mg PO QDAY 03/07/22 03/07/22 Unknown History


 


amLODIPine [Norvasc] 10 mg PO DAILY 03/07/22 03/07/22 Unknown History


 


hydrOXYzine PAMOATE [Vistaril] 25 mg PO Q6HR 03/07/22 03/07/22 Unknown History


 


levETIRAcetam [Keppra TAB] 250 mg PO 3XW 03/07/22 03/07/22 Unknown History


 


levETIRAcetam [Keppra TAB] 500 mg PO Q12H 03/07/22 03/07/22 Unknown History











Active Meds: 


Active Medications





Acetaminophen (Acetaminophen 325 Mg Tab)  650 mg PO Q4H PRN


   PRN Reason: Pain MILD(1-3)/Fever >100.5/HA


Heparin Sodium (Porcine) (Heparin 5,000 Unit/1 Ml Vial)  5,000 unit SUB-Q Q12HR 

[facility]


   Last Admin: 03/07/22 13:18 Dose:  Not Given


   


Morphine Sulfate (Morphine 4 Mg/1 Ml Inj)  4 mg IV Q4H PRN


   PRN Reason: Pain , Severe (7-10)


Naloxone HCl (Naloxone 0.4 Mg/1 Ml Inj)  0.1 mg IV Q2MIN PRN


   PRN Reason: Res Rate </= 8 or 02 SAT < 92%


Ondansetron HCl (Ondansetron 4 Mg/2 Ml Inj)  4 mg IV Q8H PRN


   PRN Reason: Nausea And Vomiting


Oxycodone/Acetaminophen (Oxycodone /Acetaminophen 5-325mg Tab)  1 tab PO Q6H PRN


   PRN Reason: Pain, Moderate (4-6)


Sodium Chloride (Sodium Chloride 0.9% 10 Ml Flush Syringe)  10 ml IV BID [facility]


   Last Admin: 03/07/22 13:18 Dose:  Not Given


   


Sodium Chloride (Sodium Chloride 0.9% 10 Ml Flush Syringe)  10 ml IV PRN PRN


   PRN Reason: LINE FLUSH











Review of Systems


Cardiovascular: chest pain, other (Left neck pain), no orthopnea, no 

palpitations, no rapid/irregular heart beat, no edema, no syncope, no 

lightheadedness, no shortness of breath





Physical Examination


                                   Vital Signs











Temp Pulse Resp BP Pulse Ox


 


 98.7 F   70   18   157/92   100 


 


 03/07/22 04:39  03/07/22 04:39  03/07/22 04:39  03/07/22 04:39  03/07/22 04:39











General appearance: no acute distress


HEENT: Positive: PERRL


Neck: Positive: neck supple


Cardiac: Positive: Reg Rate and Rhythm


Lungs: Positive: Decreased Breath Sounds


Neuro: Positive: Grossly Intact


Abdomen: Positive: Soft


Male genitourinary: Positive: deferred


Skin: Positive: Clear


Extremities: Absent: edema





Results





                                 03/07/22 05:10





                                 03/07/22 05:10


                                 Cardiac Enzymes











  03/07/22 Range/Units





  05:10 


 


CK-MB (CK-2)  4.7 H  (0.0-4.0)  ng/mL








                                   Coagulation











  03/07/22 Range/Units





  05:10 


 


PT  15.2 H  (12.2-14.9)  Sec.


 


INR  1.08  (0.87-1.13)  








                                     Lipids











  03/07/22 Range/Units





  05:10 


 


Triglycerides  88  (2-149)  mg/dL


 


Cholesterol  96  ()  mg/dL


 


HDL Cholesterol  33 L  (40-59)  mg/dL


 


Cholesterol/HDL Ratio  2.90  %








                                       CBC











  03/07/22 Range/Units





  05:10 


 


WBC  9.2  (4.5-11.0)  K/mm3


 


RBC  3.48 L  (3.65-5.03)  M/mm3


 


Hgb  10.5 L  (11.8-15.2)  gm/dl


 


Hct  31.0 L  (35.5-45.6)  %


 


Plt Count  143  (140-440)  K/mm3


 


Lymph # (Auto)  0.6 L  (1.2-5.4)  K/mm3


 


Mono # (Auto)  0.8  (0.0-0.8)  K/mm3


 


Eos # (Auto)  0.5 H  (0.0-0.4)  K/mm3


 


Baso # (Auto)  0.1  (0.0-0.1)  K/mm3








                          Comprehensive Metabolic Panel











  03/07/22 Range/Units





  05:10 


 


Sodium  139  (137-145)  mmol/L


 


Potassium  4.7  (3.6-5.0)  mmol/L


 


Chloride  96.7 L  ()  mmol/L


 


Carbon Dioxide  22  (22-30)  mmol/L


 


BUN  79 H  (9-20)  mg/dL


 


Creatinine  14.1 H  (0.8-1.3)  mg/dL


 


Glucose  162 H  ()  mg/dL


 


Calcium  8.8  (8.4-10.2)  mg/dL














EKG interpretations





- Telemetry


EKG Rhythm: Sinus Rhythm





Assessment and Plan





- Patient Problems


(1) Chest pain


Current Visit: Yes   Status: Acute   


Plan to address problem: 


Patient has predominantly left-sided neck pain, associated with it because of 

his  shunt implanted recently.  Pain is aggravated by movements of the head 

and neck.  No indication of angina pectoris or cardiac related chest pain.  

Troponin level of 0.18 is likely nonspecific finding in the setting of his end-

stage renal disease.  I will recommend another set of troponin levels, I will 

request his records from [facility] for review of any prior cardiac work-up, 

otherwise conservative cardiac management and follow-up at this time.

## 2022-03-07 NOTE — EMERGENCY DEPARTMENT REPORT
ED Chest Pain HPI





- General


Chief Complaint: Chest Pain


Stated Complaint: LT NECK AND CHEST PAIN


PUI?: No


Time Seen by Provider: 03/07/22 06:22


Source: patient, EMS


Mode of arrival: Stretcher


Limitations: No Limitations





- History of Present Illness


Initial Comments: 





Chief complaint: "I have this dull pain.  I thought I was having a heart 

attack."





HPI: This is a 50-year-old male with a history of hypertension, end-stage renal 

disease on hemodialysis, hemorrhagic CVA who presents with left chest pain 

rating to the neck.  Sudden onset while resting at home.  No association with 

exertion or eating.  He denies associated shortness of breath or vomiting.  The 

dull pain lasted several hours.  Pain is now subsiding.  He has persistent mild 

pain at the left neck where his  shunt is located.   shunt was placed 1 

month ago at [facility] to "relieve pressure on the optic nerve".  No history of 

heart attack.  He does have history of tachycardia.


MD Complaint: chest pain


-: Sudden, This morning


Onset: during rest


Pain Location: left chest


Pain Radiation: neck


Severity scale (0 -10): 0


Quality: dull


Consistency: now resolved


Improves With: nothing


Worsens With: nothing


Context: other (Last month diagnosed with hemorrhagic CVA)


re: denies: nausea, vomting


Other Symptoms: denies: cough


Treatments Prior to Arrival: none





- Related Data


                                Home Medications











 Medication  Instructions  Recorded  Confirmed  Last Taken


 


AtorvaSTATin [Lipitor] 40 mg PO QHS 03/07/22 03/07/22 Unknown


 


Cimetidine 1 tab PO DAILY 03/07/22 03/07/22 Unknown


 


Hydralazine HCl 50 mg PO TID 03/07/22 03/07/22 Unknown


 


Labetalol HCl [Labetalol 300mg TAB] 300 mg PO BID 03/07/22 03/07/22 Unknown


 


NIFEdipine [Nifedipine ER] 90 mg PO 03/07/22  Unknown


 


Sevelamer Carbonate [Renvela] 800 mg PO TIDWM 03/07/22 03/07/22 Unknown


 


Sucroferric Oxyhydroxide(Nf) 500 mg PO TID 03/07/22 03/07/22 Unknown





[Velphoro (Nf)]    


 


Torsemide [Demadex] 100 mg PO QDAY 03/07/22 03/07/22 Unknown


 


Vit B Comp No.3/Folic/C/Biotin 1 each PO 03/07/22  Unknown





[Nhi-Paramjit Rx Tablet]    


 


Warfarin [Coumadin] 5 mg PO QDAY 03/07/22 03/07/22 Unknown


 


acetaZOLAMIDE 125 mg PO BID 03/07/22 03/07/22 Unknown


 


amLODIPine [Norvasc] 10 mg PO DAILY 03/07/22 03/07/22 Unknown


 


hydrOXYzine PAMOATE [Vistaril] 25 mg PO Q6HR 03/07/22 03/07/22 Unknown


 


levETIRAcetam [Keppra TAB] 250 mg PO BID 03/07/22 03/07/22 Unknown


 


levETIRAcetam [Keppra TAB] 500 mg PO BID 03/07/22 03/07/22 Unknown











                                    Allergies











Allergy/AdvReac Type Severity Reaction Status Date / Time


 


promethazine [From Phenergan] Allergy  Nausea Verified 03/07/22 04:43














Heart Score





- HEART Score


History: Moderately suspicious


EKG: Non-specific


Age: 45-65


Risk factors: > 3 risk factors or hx of atherosclerotic disease


Troponin: < normal limit


HEART Score: 5





- EKG Read Time


Time EKG Completed: 06:13


EKG Read Time: 06:13





- Critical Actions


Critical Actions: 4-6 pts:12-16.6% risk of adverse cardiac event. Should be 

admitted





ED Review of Systems


ROS: 


Stated complaint: LT NECK AND CHEST PAIN


Other details as noted in HPI





Comment: All other systems reviewed and negative


Constitutional: denies: chills, fever, malaise


Respiratory: denies: cough, shortness of breath


Cardiovascular: chest pain


Gastrointestinal: denies: abdominal pain, nausea, vomiting


Skin: denies: rash, lesions





ED Past Medical Hx





- Past Medical History


Previous Medical History?: Yes


Hx Hypertension: Yes


Hx CVA: Yes (Hemorrhagic CVA)


Hx Kidney Stones: Yes (ESRD)


Additional medical history: AAA, BRAIN BLEED





- Surgical History


Past Surgical History?: Yes


Additional Surgical History: Left upper extremity fistula





- Family History


Family history: hypertension





- Social History


Smoking Status: Never Smoker


Substance Use Type: None





- Medications


Home Medications: 


                                Home Medications











 Medication  Instructions  Recorded  Confirmed  Last Taken  Type


 


AtorvaSTATin [Lipitor] 40 mg PO QHS 03/07/22 03/07/22 Unknown History


 


Cimetidine 1 tab PO DAILY 03/07/22 03/07/22 Unknown History


 


Hydralazine HCl 50 mg PO TID 03/07/22 03/07/22 Unknown History


 


Labetalol HCl [Labetalol 300mg TAB] 300 mg PO BID 03/07/22 03/07/22 Unknown 

History


 


NIFEdipine [Nifedipine ER] 90 mg PO 03/07/22  Unknown History


 


Sevelamer Carbonate [Renvela] 800 mg PO TIDWM 03/07/22 03/07/22 Unknown History


 


Sucroferric Oxyhydroxide(Nf) 500 mg PO TID 03/07/22 03/07/22 Unknown History





[Velphoro (Nf)]     


 


Torsemide [Demadex] 100 mg PO QDAY 03/07/22 03/07/22 Unknown History


 


Vit B Comp No.3/Folic/C/Biotin 1 each PO 03/07/22  Unknown History





[Nhi-Paramjit Rx Tablet]     


 


Warfarin [Coumadin] 5 mg PO QDAY 03/07/22 03/07/22 Unknown History


 


acetaZOLAMIDE 125 mg PO BID 03/07/22 03/07/22 Unknown History


 


amLODIPine [Norvasc] 10 mg PO DAILY 03/07/22 03/07/22 Unknown History


 


hydrOXYzine PAMOATE [Vistaril] 25 mg PO Q6HR 03/07/22 03/07/22 Unknown History


 


levETIRAcetam [Keppra TAB] 250 mg PO BID 03/07/22 03/07/22 Unknown History


 


levETIRAcetam [Keppra TAB] 500 mg PO BID 03/07/22 03/07/22 Unknown History














ED Physical Exam





- General


Limitations: No Limitations


General appearance: alert, in no apparent distress





- Head


Head exam: Present: atraumatic, normocephalic





- Eye


Eye exam: Present: normal appearance





- ENT


ENT exam: Present: mucous membranes moist





- Neck


Neck exam: Present: normal inspection, full ROM, other ( shunt catheter 

intact).  Absent: tenderness, meningismus, lymphadenopathy, thyromegaly





- Respiratory


Respiratory exam: Present: normal lung sounds bilaterally.  Absent: respiratory 

distress, wheezes, rales





- Cardiovascular


Cardiovascular Exam: Present: regular rate, normal rhythm, normal heart sounds. 

Absent: systolic murmur, diastolic murmur, rubs, gallop





- GI/Abdominal


GI/Abdominal exam: Present: soft, normal bowel sounds.  Absent: distended, 

tenderness, guarding, rebound





- Rectal


Rectal exam: Present: deferred





- Extremities Exam


Extremities exam: Present: other (Left forearm AV fistula present).  Absent: 

pedal edema





- Neurological Exam


Neurological exam: Present: alert, oriented X3





- Psychiatric


Psychiatric exam: Present: normal affect, normal mood





- Skin


Skin exam: Present: warm, dry, intact, normal color.  Absent: rash





ED Course


                                   Vital Signs











  03/07/22 03/07/22 03/07/22





  04:39 05:01 05:05


 


Temperature 98.7 F  98.0 F


 


Pulse Rate 70  71


 


Respiratory 18 13 18





Rate   


 


Blood Pressure   


 


Blood Pressure 157/92  157/92





[Right]   


 


O2 Sat by Pulse 100  97





Oximetry   














  03/07/22 03/07/22 03/07/22





  06:00 07:37 07:41


 


Temperature  98.5 F 


 


Pulse Rate 70  


 


Respiratory 10 L  





Rate   


 


Blood Pressure 152/90  


 


Blood Pressure   





[Right]   


 


O2 Sat by Pulse 97  96





Oximetry   














  03/07/22





  08:00


 


Temperature 


 


Pulse Rate 66


 


Respiratory 8 L





Rate 


 


Blood Pressure 161/91


 


Blood Pressure 





[Right] 


 


O2 Sat by Pulse 94





Oximetry 














- Reevaluation(s)


Reevaluation #1: 





03/07/22 08:28


Shortly after patient was admitted to the hospitalist service, he told the nurse

 "I have a neurological issue.  You are not addressing my headache."  Patient 

developed headache during ED encounter.  He did not have a headache upon 

arrival.  I have ordered CT head to rule out hydrocephalus or intracranial 

hemorrhage.  Pain control with IV Zofran IV morphine and Tylenol also ordered.





ED Medical Decision Making





- Lab Data


Result diagrams: 


                                 03/07/22 05:10





                                 03/07/22 05:10





- EKG Data


-: EKG Interpreted by Me


EKG shows normal: sinus rhythm


Rate: normal





- EKG Data





03/07/22 06:29


EKG obtained 013 EKG interpreted by me


Normal sinus rhythm rate 70 bpm left axis deviation right bundle branch block no

 ST elevation prolonged QTC





- Radiology Data


Radiology results: report reviewed





Patient Name: LETICIA TERRELL


Patient ID: [id number]


Gender: Male


YOB: 1971


Home Phone: [phone]


Referring Provider: PILAR JASON


Organization: [facility]


Accession Number: [id number]


Requested Date: March 7, 2022 05:01


Report Status: Final


Requested Procedure: 1


Procedure Description: XR chest 1V ap


Modality: XR


Findings


Reporting MD: Tay Villanueva


Dictation Time: March 7, 2022 04:33


: Not available


Transcription Date:


CHEST 1 VIEW 


INDICATION / CLINICAL INFORMATION: chest pain. 


COMPARISON: None available. 


FINDINGS: 


SUPPORT DEVICES: Possible  shunt tubing left chest not well visualized within 

the mediastinum or below. 


HEART / MEDIASTINUM: Heart upper limits of normal in size. 


LUNGS / PLEURA: No significant pulmonary or pleural abnormality. No p

neumothorax. 


ADDITIONAL FINDINGS: Patient rotated to the right. 


IMPRESSION: 


1. No active cardiopulmonary disease. 


Signer Name: Tay Villanueva II, MD





Patient Name: LETICIA TERRELL


Patient ID: [id number]


Gender: Male


YOB: 1971


Home Phone: [phone]


Referring Provider: SELVIN DARLING


Organization: [facility]


Accession Number: [id number]


Requested Date: March 7, 2022 08:27


Report Status: Final


Requested Procedure: 1


Procedure Description: CT head/brain wo con


Modality: CT


Findings


Reporting MD: Manjit Zarate


Dictation Time: March 7, 2022 08:01


: Not available


Transcription Date:


CT BRAIN: 3/7/2022 


INDICATION / CLINICAL INFORMATION: 


 shunt headache hx of ICH. 


COMPARISON: 


CT brain 2/22/2022 


FINDINGS: 


BRAIN/INTRACRANIAL STRUCTURES: Unenhanced CT images of the brain were obtained 

and compared to the prior exam from


2/22/2022. 


Again seen is the left frontal shunt tube, which extends through left frontal 

edema and hemorrhagic change. The hemorrhagic area


associated along the course of the shunt tube in the left frontal lobe is of 

somewhat decreased density compared to the prior exam,


although this density is somewhat larger and overall extent, now with a maximum 

length of 3.2 cm as compared to 2.6 cm. This is


likely related to normal evolutionary changes in the previous hemorrhage. The 

overall extent of surrounding vasogenic edema is


also slightly increased. 


The metallic densities in the right occipital scalp and calvarium, including the

 region of the right sigmoid sinus are again noted,


presumably due to prior injury or treatment. 


EXTRACRANIAL STRUCTURES: Unremarkable. 


IMPRESSION: 


Hemorrhagic and edematous changes along the course of the left frontal shunt 

tube, and pattern most likely due to expected


evolutionary changes compared to 2/22/2022. 


All CT scans at this location are performed using dose reduction to ALARA by 

means of automated exposure control. 


Signer Name: Manjit Zarate MD 


Signed: 3/7/2022 8:01 AM 


Workstation Name: [id number]





- Medical Decision Making





1.  Acute coronary syndrome: Patient heart score 5, elevated troponin unclear if

 this represents acute myocardial injury in the setting of end-stage renal 

disease.  Patient is currently pain-free.  He received aspirin in emergency 

department.  Will be admitted for cardiac evaluation.





2.  End-stage renal disease on hemodialysis: No indication for emergent 

dialysis.  Potassium within normal range.  No acidosis.  Normal mental status.





3.  Tension headache: CT head did not show new neurological insult, evolving 

previous hemorrhage present.





Patient is admitted to our service in stable condition.


Critical care attestation.: 


If time is entered above; I have spent that time in minutes in the direct care 

of this critically ill patient, excluding procedure time.








ED Disposition


Clinical Impression: 


 Acute coronary syndrome, End-stage renal disease on hemodialysis, History of 

hemorrhagic cerebrovascular accident (CVA) without residual deficits





Disposition: 09 ADMITTED AS INPATIENT


Is pt being admited?: Yes


Does the pt Need Aspirin: No


Condition: Stable

## 2022-03-08 NOTE — ELECTROCARDIOGRAPH REPORT
[facility]

                                       

Test Date:    2022               Test Time:    06:13:56

Pat Name:     LETICIA TERRELL              Department:   

Patient ID:   [id number]          Room:         A474

Gender:       M                        Technician:   SHANNAN

:          1971               Requested By: PILAR JSAON

Order Number: [id number]              Reading MD:   Rusty Pichardo

                                 Measurements

Intervals                              Axis          

Rate:         69                       P:            44

NH:           207                      QRS:          -37

QRSD:         166                      T:            26

QT:           461                                    

QTc:          494                                    

                           Interpretive Statements

Sinus rhythm

Borderline prolonged NH interval

Right bundle branch block

No previous ECG available for comparison

Electronically Signed On 3-8-2022 10:22:30 EST by Rusty Pichardo

## 2022-03-08 NOTE — PROGRESS NOTE
Assessment and Plan


Assessment and plan: 


#NSTEMI


#Noncardiac chest pain


-Troponin 0.180-0.211 EKG without acute ST changes


-Likely type II NSTEMI due to renal disease


-Chest pain resolved, cardiology following





#Headache


#History of hemorrhagic stroke


#History of papilledema s/p  shunt placement


-CT head obtained, please see full report


-Patient reports hemorrhagic stroke and  shunt recently placed at [facility]


-Neurosurgeon Dr. Rider was consulted and recommended transferring patient to 

[facility]


-I spoke with Dr. Rony Pitts, A neurosurgeon at [facility] and associate of the 

physician who placed the  shunt.  He stated that it was highly unlikely that 

the patient's shunt is malfunctioning due to how recently it was placed and its 

indication.   shunt was placed after the patient began having vision loss 

secondary to papilledema.  We discussed the CT findings.  The patient has been 

noncompliant in the past and has not yet followed up in clinic.


-Patient will need to schedule outpatient appointment with neurosurgeon at [facility]

at discharge


-We will continue aspirin and atorvastatin


Please consult neurology





#End-stage renal disease requiring dialysis


-Patient reportedly missing hemodialysis, currently on TTS schedule


HD per schedule, nephrology following


Avoid nephrotoxins, renal dosing of medications





#Hypertension


-Blood pressure stable at time of admission


-We will resume home blood pressure medications


-Goal as SBP while inpatient is less than 160





#Normocytic anemia


-Likely anemia of ESRD


-Transfuse for hemoglobin less than 7





#Advanced care planning


-Disease education conducted, care plan discussed, diagnoses discussed, 


prognosis discussed, and patient acknowledges understanding with care plan


-Time: +30 min





Advance Directives: No


VTE prophylaxis?: Chemical


Plan of care discussed with patient/family: Yes











Hospitalist Physical





- Constitutional


Vitals: 


                                        











Temp Pulse Resp BP Pulse Ox


 


 98.7 F   81   18   163/93   100 


 


 03/08/22 05:00  03/08/22 05:32  03/08/22 05:00  03/08/22 05:32  03/08/22 05:00











General appearance: Present: no acute distress





HEART Score





- HEART Score


EKG: Non-specific


Age: 45-65


Risk factors: > 3 risk factors or hx of atherosclerotic disease


Troponin: 


                                        











Troponin T  0.211 ng/mL (0.00-0.029)  H*  03/08/22  06:16    











Troponin: < normal limit





- Critical Actions


Critical Actions: 4-6 pts:12-16.6% risk of adverse cardiac event. Should be 

admitted





Results





- Labs


CBC & Chem 7: 


                                 03/07/22 05:10





                                 03/08/22 05:12


Labs: 


                             Laboratory Last Values











WBC  9.2 K/mm3 (4.5-11.0)   03/07/22  05:10    


 


RBC  3.48 M/mm3 (3.65-5.03)  L  03/07/22  05:10    


 


Hgb  10.5 gm/dl (11.8-15.2)  L  03/07/22  05:10    


 


Hct  31.0 % (35.5-45.6)  L  03/07/22  05:10    


 


MCV  89 fl (84-94)   03/07/22  05:10    


 


MCH  30 pg (28-32)   03/07/22  05:10    


 


MCHC  34 % (32-34)   03/07/22  05:10    


 


RDW  16.7 % (13.2-15.2)  H  03/07/22  05:10    


 


Plt Count  143 K/mm3 (140-440)   03/07/22  05:10    


 


Lymph % (Auto)  6.1 % (13.4-35.0)  L  03/07/22  05:10    


 


Mono % (Auto)  8.7 % (0.0-7.3)  H  03/07/22  05:10    


 


Eos % (Auto)  5.6 % (0.0-4.3)  H  03/07/22  05:10    


 


Baso % (Auto)  0.9 % (0.0-1.8)   03/07/22  05:10    


 


Lymph # (Auto)  0.6 K/mm3 (1.2-5.4)  L  03/07/22  05:10    


 


Mono # (Auto)  0.8 K/mm3 (0.0-0.8)   03/07/22  05:10    


 


Eos # (Auto)  0.5 K/mm3 (0.0-0.4)  H  03/07/22  05:10    


 


Baso # (Auto)  0.1 K/mm3 (0.0-0.1)   03/07/22  05:10    


 


Seg Neutrophils %  78.7 % (40.0-70.0)  H  03/07/22  05:10    


 


Seg Neutrophils #  7.2 K/mm3 (1.8-7.7)   03/07/22  05:10    


 


PT  15.2 Sec. (12.2-14.9)  H  03/07/22  05:10    


 


INR  1.08  (0.87-1.13)   03/07/22  05:10    


 


Sodium  140 mmol/L (137-145)   03/08/22  05:12    


 


Potassium  4.6 mmol/L (3.6-5.0)   03/08/22  05:12    


 


Chloride  98.1 mmol/L ()   03/08/22  05:12    


 


Carbon Dioxide  26 mmol/L (22-30)   03/08/22  05:12    


 


Anion Gap  21 mmol/L  03/08/22  05:12    


 


BUN  48 mg/dL (9-20)  H  03/08/22  05:12    


 


Creatinine  10.9 mg/dL (0.8-1.3)  H  03/08/22  05:12    


 


Estimated GFR  6 ml/min  03/08/22  05:12    


 


BUN/Creatinine Ratio  4 %  03/08/22  05:12    


 


Glucose  85 mg/dL ()   03/08/22  05:12    


 


Calcium  8.8 mg/dL (8.4-10.2)   03/08/22  05:12    


 


Total Creatine Kinase  146 units/L ()   03/07/22  05:10    


 


CK-MB (CK-2)  4.7 ng/mL (0.0-4.0)  H  03/07/22  05:10    


 


CK-MB (CK-2) Rel Index  3.2  (0-4)   03/07/22  05:10    


 


Troponin T  0.211 ng/mL (0.00-0.029)  H*  03/08/22  06:16    


 


Triglycerides  88 mg/dL (2-149)   03/07/22  05:10    


 


Cholesterol  96 mg/dL ()   03/07/22  05:10    


 


LDL Cholesterol Direct  45 mg/dL ()  L  03/07/22  05:10    


 


HDL Cholesterol  33 mg/dL (40-59)  L  03/07/22  05:10    


 


Cholesterol/HDL Ratio  2.90 %  03/07/22  05:10    


 


Urine Color  Yellow  (Yellow)   03/07/22  05:08    


 


Urine Turbidity  Clear  (Clear)   03/07/22  05:08    


 


Urine pH  8.0  (5.0-7.0)  H  03/07/22  05:08    


 


Ur Specific Gravity  1.013  (1.003-1.030)   03/07/22  05:08    


 


Urine Protein  >500 mg/dL (Negative)   03/07/22  05:08    


 


Urine Glucose (UA)  150 mg/dL (Negative)   03/07/22  05:08    


 


Urine Ketones  Neg mg/dL (Negative)   03/07/22  05:08    


 


Urine Blood  Sm  (Negative)   03/07/22  05:08    


 


Urine Nitrite  Neg  (Negative)   03/07/22  05:08    


 


Urine Bilirubin  Neg  (Negative)   03/07/22  05:08    


 


Urine Urobilinogen  < 2.0 mg/dL (<2.0)   03/07/22  05:08    


 


Ur Leukocyte Esterase  Neg  (Negative)   03/07/22  05:08    


 


Urine WBC (Auto)  3.0 /HPF (0.0-6.0)   03/07/22  05:08    


 


Urine RBC (Auto)  2.0 /HPF (0.0-6.0)   03/07/22  05:08    


 


U Epithel Cells (Auto)  < 1.0 /HPF (0-13.0)   03/07/22  05:08    


 


Hepatitis A IgM Ab  Non-reactive  (NonReactive)   03/07/22  09:07    


 


Hep Bs Antigen  Non-reactive  (Negative)   03/07/22  09:07    


 


Hep B Core IgM Ab  Non-reactive  (NonReactive)   03/07/22  09:07    


 


Hepatitis C Antibody  Non-reactive  (NonReactive)   03/07/22  09:07    











Hooper/IV: 


                                        





Voiding Method                   Urinal











Active Medications





- Current Medications


Current Medications: 














Generic Name Dose Route Start Last Admin





  Trade Name Freq  PRN Reason Stop Dose Admin


 


Acetaminophen  650 mg  03/07/22 09:00 





  Acetaminophen 325 Mg Tab  PO  





  Q4H PRN  





  Pain MILD(1-3)/Fever >100.5/HA  


 


Atorvastatin Calcium  40 mg  03/07/22 22:00  03/07/22 22:02





  Atorvastatin 40 Mg Tab  PO   40 mg





  QHS SYDNEE   Administration


 


Heparin Sodium (Porcine)  5,000 unit  03/07/22 10:00  03/07/22 22:02





  Heparin 5,000 Unit/1 Ml Vial  SUB-Q   5,000 unit





  Q12HR SYDNEE   Administration


 


Hydralazine HCl  100 mg  03/07/22 22:00  03/08/22 05:32





  Hydralazine 100 Mg Tab  PO   100 mg





  Q8HR SYDNEE   Administration


 


Hydroxyzine Pamoate  25 mg  03/07/22 18:00  03/08/22 06:05





  Hydroxyzine Pamoate 25 Mg Cap  PO   Not Given





  Q6HR SYDNEE  


 


Labetalol HCl  600 mg  03/07/22 22:00  03/08/22 05:32





  Labetalol 200 Mg Tab  PO   600 mg





  Q8HR SYDNEE   Administration


 


Morphine Sulfate  4 mg  03/07/22 09:00 





  Morphine 4 Mg/1 Ml Inj  IV  





  Q4H PRN  





  Pain , Severe (7-10)  


 


Naloxone HCl  0.1 mg  03/07/22 09:00 





  Naloxone 0.4 Mg/1 Ml Inj  IV  





  Q2MIN PRN  





  Res Rate </= 8 or 02 SAT < 92%  


 


Nifedipine  90 mg  03/07/22 18:00  03/07/22 19:45





  Nifedipine Xl 90 Mg Tab  PO   90 mg





  QDAY SYDNEE   Administration


 


Ondansetron HCl  4 mg  03/07/22 09:00 





  Ondansetron 4 Mg/2 Ml Inj  IV  





  Q8H PRN  





  Nausea And Vomiting  


 


Oxycodone/Acetaminophen  1 tab  03/07/22 09:00 





  Oxycodone /Acetaminophen 5-325mg Tab  PO  





  Q6H PRN  





  Pain, Moderate (4-6)  


 


Sevelamer Carbonate  1,600 mg  03/07/22 17:00  03/07/22 18:02





  Sevelamer Carbonate 800 Mg Tab  PO   Not Given





  TIDWM SYDNEE  


 


Sodium Chloride  10 ml  03/07/22 10:00  03/08/22 00:18





  Sodium Chloride 0.9% 10 Ml Flush Syringe  IV   10 ml





  BID SYDNEE   Administration


 


Sodium Chloride  10 ml  03/07/22 09:00 





  Sodium Chloride 0.9% 10 Ml Flush Syringe  IV  





  PRN PRN  





  LINE FLUSH

## 2022-03-08 NOTE — PROGRESS NOTE
Assessment and Plan





- Patient Problems


(1) Chest pain


Current Visit: Yes   Status: Acute   


Plan to address problem: 


Patient has predominantly left-sided neck pain, along the course of his  shunt

implanted recently.  Pain is aggravated by movements of the head and neck.  No 

indication of angina pectoris or cardiac related chest pain.  Troponin level of 

0.18-0.2 likely a nonspecific finding in the setting of his end-stage renal 

disease.  Conservative cardiac management and follow-up at this time.








Subjective


Date of service: 03/08/22


Principal diagnosis: Neck pain


Interval history: 





Patient is planned for dialysis today, no new cardiac complaints, no cardiac 

events reported.





Objective


                                   Vital Signs











  Temp Pulse Resp BP BP Pulse Ox Pulse Ox


 


 03/08/22 12:00   61   129/74   


 


 03/08/22 11:51       95 


 


 03/08/22 11:45   66   122/74   


 


 03/08/22 11:30   66   131/72   


 


 03/08/22 11:15   66   144/81   


 


 03/08/22 11:00   66   129/73   


 


 03/08/22 10:45   68   138/80   


 


 03/08/22 10:32   65   133/78   


 


 03/08/22 10:17  98.3 F  66  18  123/77    97


 


 03/08/22 08:11    0 L  151/101   


 


 03/08/22 08:01    0 L  151/101   


 


 03/08/22 07:51    0 L  151/101   


 


 03/08/22 07:41    0 L  151/101   


 


 03/08/22 07:37  98.0 F  66   109/72   94 


 


 03/08/22 07:31    0 L  151/101   


 


 03/08/22 07:21    0 L  151/101   


 


 03/08/22 07:11    0 L  151/101   


 


 03/08/22 07:01    0 L  151/101   


 


 03/08/22 06:51    0 L  151/101   


 


 03/08/22 06:41    0 L  151/101   


 


 03/08/22 06:31    0 L  151/101   


 


 03/08/22 06:21    0 L  151/101   


 


 03/08/22 06:11    0 L  151/101   


 


 03/08/22 06:01    0 L  151/101   


 


 03/08/22 05:51    0 L  151/101   


 


 03/08/22 05:41    0 L  151/101   


 


 03/08/22 05:32   81   163/93   


 


 03/08/22 05:31    0 L  151/101   


 


 03/08/22 05:21    0 L  151/101   


 


 03/08/22 05:11    0 L  151/101   


 


 03/08/22 05:01    0 L  151/101   


 


 03/08/22 05:00  98.7 F  81  18   163/93  100 


 


 03/08/22 04:51    0 L  151/101   


 


 03/08/22 04:41    0 L  151/101   


 


 03/08/22 04:31    0 L  151/101   


 


 03/08/22 04:21    0 L  151/101   


 


 03/08/22 04:11    0 L  151/101   


 


 03/08/22 04:01    0 L  151/101   


 


 03/08/22 03:51    0 L  151/101   


 


 03/08/22 03:41    0 L  151/101   


 


 03/08/22 03:31    0 L  151/101   


 


 03/08/22 03:21    0 L  151/101   


 


 03/08/22 03:11    0 L  151/101   


 


 03/08/22 03:01    0 L  151/101   


 


 03/08/22 02:51    0 L  151/101   


 


 03/08/22 02:41    0 L  151/101   


 


 03/08/22 02:31    0 L  151/101   


 


 03/08/22 02:25       100 


 


 03/08/22 02:21    0 L  151/101   


 


 03/08/22 02:11    0 L  151/101   


 


 03/08/22 02:01    0 L  151/101   


 


 03/08/22 01:51    0 L  151/101   


 


 03/08/22 01:41    0 L  151/101   


 


 03/08/22 01:31    0 L  151/101   


 


 03/08/22 01:21    0 L  151/101   


 


 03/08/22 01:11    0 L  151/101   


 


 03/08/22 01:01    0 L  151/101   


 


 03/08/22 01:00       86 


 


 03/08/22 00:51    0 L  151/101   


 


 03/08/22 00:41    0 L  151/101   


 


 03/08/22 00:31    0 L  151/101   


 


 03/08/22 00:21    0 L  151/101   


 


 03/08/22 00:11    0 L  151/101   


 


 03/08/22 00:00  98.8 F  68  20   141/87  


 


 03/07/22 22:10   68   189/103   


 


 03/07/22 21:49  98 F  68  18   189/103  99 


 


 03/07/22 21:26  98.9 F  72  20  187/115    97


 


 03/07/22 20:40   70   185/120   


 


 03/07/22 20:30   63   187/95   


 


 03/07/22 20:15   69   188/106   


 


 03/07/22 20:00   70   182/105   


 


 03/07/22 19:45   70   182/108   


 


 03/07/22 19:30   67   192/107   


 


 03/07/22 19:15   71   186/114   


 


 03/07/22 19:00   75   199/109   


 


 03/07/22 18:45   73   186/107   


 


 03/07/22 18:30   70   192/108   


 


 03/07/22 18:15   69   193/107   


 


 03/07/22 18:00   68  0 L  182/109   


 


 03/07/22 17:45   71   182/110   


 


 03/07/22 17:40   71   184/99   


 


 03/07/22 17:25  97.8 F  20 L  22  187/102    97


 


 03/07/22 16:00    0 L  165/98   87 


 


 03/07/22 14:00    0 L  165/90   91 














- Physical Examination


General: No Apparent Distress


HEENT: Positive: PERRL


Neck: Positive: neck supple, trachea midline


Cardiac: Positive: Reg Rate and Rhythm


Lungs: Positive: Decreased Breath Sounds


Neuro: Positive: Grossly Intact


Abdomen: Positive: Soft


Skin: Positive: Clear


Extremities: Absent: edema





- Labs and Meds


                          Comprehensive Metabolic Panel











  03/08/22 Range/Units





  05:12 


 


Sodium  140  (137-145)  mmol/L


 


Potassium  4.6  (3.6-5.0)  mmol/L


 


Chloride  98.1  ()  mmol/L


 


Carbon Dioxide  26  (22-30)  mmol/L


 


BUN  48 H  (9-20)  mg/dL


 


Creatinine  10.9 H  (0.8-1.3)  mg/dL


 


Glucose  85  ()  mg/dL


 


Calcium  8.8  (8.4-10.2)  mg/dL














- Imaging and Cardiology


EKG: image reviewed

## 2022-03-08 NOTE — CONSULTATION
History of Present Illness





- Reason for Consult


end stage renal disease





- History of Present Illness





Very pleasant 50-year-old -American male with a past medical history of 

end-stage renal disease in the setting of biopsy-proven IgG4 glomerulopathy 

along with history of hypertension and  shunt placed for increased intraocular

pressures, presented to the emergency department secondary to head and chest 

pain.  Patient describes it as pain near the site of his  shunt that radiates 

down to his chest.  Unclear of eliciting factors.  Symptom free in this morning.

 Nephrology consulted for chronic dialysis needs.  Patient typically is on a 

Tuesday/Thursday/Saturday hemodialysis schedule.  Patient did miss his last 

dialysis schedule on Saturday.  Did have a session done yesterday here in the h

ospital.  Pending possible discharge today based on further cardiology 

recommendations.





Past History


Past Medical History: dialysis, ESRD, hypertension


Past Surgical History: Other (AV fistula placement,  shunt placement)


Social history: no significant social history, lives with family


Family history: no significant family history





Medications and Allergies


                                    Allergies











Allergy/AdvReac Type Severity Reaction Status Date / Time


 


promethazine [From Phenergan] Allergy  Nausea Verified 03/07/22 04:43











                                Home Medications











 Medication  Instructions  Recorded  Confirmed  Last Taken  Type


 


Albuterol Sulfate [Proventil Hfa] 2 puff PO Q6H PRN 03/07/22 03/07/22 Unknown 

History


 


AtorvaSTATin [Lipitor] 40 mg PO QHS 03/07/22 03/07/22 Unknown History


 


Hydralazine HCl 100 mg PO Q8H 03/07/22 03/07/22 Unknown History


 


Labetalol HCl [Labetalol 300mg TAB] 600 mg PO Q8H 03/07/22 03/07/22 Unknown 

History


 


NIFEdipine [Nifedipine ER] 90 mg PO QDAY 03/07/22 03/07/22 Unknown History


 


Sevelamer Carbonate [Renvela] 1,600 mg PO TIDWM 03/07/22 03/07/22 Unknown 

History


 


Torsemide [Demadex] 100 mg PO QDAY 03/07/22 03/07/22 Unknown History


 


Warfarin [Coumadin] 5 mg PO QDAY 03/07/22 03/07/22 Unknown History


 


amLODIPine [Norvasc] 10 mg PO DAILY 03/07/22 03/07/22 Unknown History


 


hydrOXYzine PAMOATE [Vistaril] 25 mg PO Q6HR 03/07/22 03/07/22 Unknown History


 


levETIRAcetam [Keppra TAB] 250 mg PO 3XW 03/07/22 03/07/22 Unknown History


 


levETIRAcetam [Keppra TAB] 500 mg PO Q12H 03/07/22 03/07/22 Unknown History











Active Meds: 


Active Medications





Acetaminophen (Acetaminophen 325 Mg Tab)  650 mg PO Q4H PRN


   PRN Reason: Pain MILD(1-3)/Fever >100.5/HA


Atorvastatin Calcium (Atorvastatin 40 Mg Tab)  40 mg PO QHS [facility]


   Last Admin: 03/07/22 22:02 Dose:  40 mg


   


Heparin Sodium (Porcine) (Heparin 5,000 Unit/1 Ml Vial)  5,000 unit SUB-Q Q12HR 

[facility]


   Last Admin: 03/07/22 22:02 Dose:  5,000 unit


   


Hydralazine HCl (Hydralazine 100 Mg Tab)  100 mg PO Q8HR [facility]


   Last Admin: 03/08/22 05:32 Dose:  100 mg


   


Hydroxyzine Pamoate (Hydroxyzine Pamoate 25 Mg Cap)  25 mg PO Q6HR [facility]


   Last Admin: 03/08/22 06:05 Dose:  Not Given


   


Labetalol HCl (Labetalol 200 Mg Tab)  600 mg PO Q8HR [facility]


   Last Admin: 03/08/22 05:32 Dose:  600 mg


   


Morphine Sulfate (Morphine 4 Mg/1 Ml Inj)  4 mg IV Q4H PRN


   PRN Reason: Pain , Severe (7-10)


Naloxone HCl (Naloxone 0.4 Mg/1 Ml Inj)  0.1 mg IV Q2MIN PRN


   PRN Reason: Res Rate </= 8 or 02 SAT < 92%


Nifedipine (Nifedipine Xl 90 Mg Tab)  90 mg PO QDAY [facility]


   Last Admin: 03/07/22 19:45 Dose:  90 mg


   


Ondansetron HCl (Ondansetron 4 Mg/2 Ml Inj)  4 mg IV Q8H PRN


   PRN Reason: Nausea And Vomiting


Oxycodone/Acetaminophen (Oxycodone /Acetaminophen 5-325mg Tab)  1 tab PO Q6H PRN


   PRN Reason: Pain, Moderate (4-6)


Sevelamer Carbonate (Sevelamer Carbonate 800 Mg Tab)  1,600 mg PO TIDWM [facility]


   Last Admin: 03/07/22 18:02 Dose:  Not Given


   


Sodium Chloride (Sodium Chloride 0.9% 10 Ml Flush Syringe)  10 ml IV BID SYDNEE


   Last Admin: 03/08/22 00:18 Dose:  10 ml


   


Sodium Chloride (Sodium Chloride 0.9% 10 Ml Flush Syringe)  10 ml IV PRN PRN


   PRN Reason: LINE FLUSH











Review of Systems


All systems: negative


Neurological: headaches





Exam





- Vital Signs


Vital signs: 


                                   Vital Signs











Temp Pulse Resp BP Pulse Ox


 


 98.7 F   70   18   157/92   100 


 


 03/07/22 04:39  03/07/22 04:39  03/07/22 04:39  03/07/22 04:39  03/07/22 04:39














- General Appearance


General appearance: well-developed, appears stated age


EENT: ATNC


Neck: Present: neck supple, trachea midline


Respiratory: Clear to Ascultation, Normal Exam


Heart: regular


Gastrointestinal: Present: normal


Integumentary: no rash, warm and dry


Neurologic: no focal deficit, alert and oriented x3


Musculoskeletal: Present: deferred


Psychiatric: cooperative





Results





- Lab Results





                                 03/07/22 05:10





                                 03/08/22 05:12


                             Most recent lab results











Calcium  8.8 mg/dL (8.4-10.2)   03/08/22  05:12    














Assessment and Plan





- Patient Problems


(1) Chest pain


Current Visit: Yes   Status: Acute   


Plan to address problem: 


Currently chest pain-free at this time.  Only mild elevations noted in troponin.

 Pending further cardiology recommendations.








(2) End-stage renal disease on hemodialysis


Current Visit: Yes   Status: Chronic   


Plan to address problem: 


Patient did receive hemodialysis treatment yesterday as part of a make-up 

session being that he missed his Saturday treatment.  He typically is on 

Tuesday/Thursday/Saturday hemodialysis schedule and in order to place him back 

on his regular sessions I will write order for dialysis again today.  Patient is

in agreements.  From nephrology standpoint if he remains clinically stable post 

hemodialysis treatment and no further cardiac evaluations are planned then he 

can be discharged and we will follow up with him in the outpatient dialysis 

unit.








(3) Hypertensive chronic kidney disease with stage 5 chronic kidney disease or 

end stage renal disease


Current Visit: Yes   Status: Chronic   


Plan to address problem: 


Monitor blood pressures under current regimen.








(4) Anemia in chronic kidney disease (CKD)


Current Visit: Yes   Status: Acute   


Plan to address problem: 


JOSHUA therapy with dialysis

## 2022-03-08 NOTE — DISCHARGE SUMMARY
Providers





- Providers


Date of Admission: 


03/07/22 08:35





Date of discharge: 03/08/22


Attending physician: 


AMY J KOCHERLA





                                        





03/07/22 08:35


Consult to Physician [CONS] Routine 


   Comment: 


   Consulting Provider: TYREE BENÍTEZ


   Physician Instructions: 


   Reason For Exam: ESRD requiring HD





03/07/22 08:39


Consult to Physician [CONS] Routine 


   Comment: 


   Consulting Provider: MONIQUE HARRELL


   Physician Instructions: 


   Reason For Exam: nstemi, CP











Primary care physician: 


YVONNE PARSONS








Hospitalization


Condition: Stable


Hospital course: 


#NSTEMI


#Noncardiac chest pain


-Troponin 0.180-0.211 EKG without acute ST changes


-Likely type II NSTEMI due to renal disease


-Chest pain resolved, cardiology following





#Headache


#History of hemorrhagic stroke


#History of papilledema s/p  shunt placement


-CT head obtained, please see full report


-Patient reports hemorrhagic stroke and  shunt recently placed at [facility]


-Neurosurgeon Dr. Rider was consulted and recommended transferring patient to 

[facility]


-I spoke with Dr. Rony Pitts, A neurosurgeon at [facility] and associate of the 

physician who placed the  shunt.  He stated that it was highly unlikely that 

the patient's shunt is malfunctioning due to how recently it was placed and its 

indication.   shunt was placed after the patient began having vision loss 

secondary to papilledema.  We discussed the CT findings.  The patient has been 

noncompliant in the past and has not yet followed up in clinic.


-Patient will need to schedule outpatient appointment with neurosurgeon at [facility]

 at discharge


-We will continue aspirin and atorvastatin


Please consult neurology





#End-stage renal disease requiring dialysis


-Patient reportedly missing hemodialysis, currently on TTS schedule


HD per schedule, nephrology following


Avoid nephrotoxins, renal dosing of medications





#Hypertension


-Blood pressure stable at time of admission


-We will resume home blood pressure medications


-Goal as SBP while inpatient is less than 160





#Normocytic anemia


-Likely anemia of ESRD


-Transfuse for hemoglobin less than 7





#Advanced care planning


-Disease education conducted, care plan discussed, diagnoses discussed, 


prognosis discussed, and patient acknowledges understanding with care plan


-Time: +30 min








Final Discharge Diagnosis (Prints w/discharge instructions): Non-ST elevation 

MI.  Noncardiac chest pain.  Headache.  History of hemorrhagic stroke.  History 

of papilledema status post  shunt placement.  End-stage renal disease on 

hemodialysis.  Hypertension.  Normocytic anemia





Core Measure Documentation





- Palliative Care


Palliative Care/ Comfort Measures: Not Applicable





- Core Measures


Any of the following diagnoses?: none





Exam





- Constitutional


Vitals: 


                                        











Temp Pulse Resp BP Pulse Ox


 


 98.3 F   71   20   148/81   93 


 


 03/08/22 15:20  03/08/22 15:20  03/08/22 15:20  03/08/22 15:20  03/08/22 15:20











General appearance: Present: no acute distress, well-nourished





- EENT


Eyes: Present: PERRL, EOM intact





- Neck


Neck: Present: supple, normal ROM





- Respiratory


Respiratory effort: normal


Respiratory: bilateral: diminished, negative: rales, rhonchi, wheezing





- Cardiovascular


Rhythm: regular


Heart Sounds: Present: S1 & S2





- Extremities


Extremities: no ischemia, No edema





- Abdominal


General gastrointestinal: Present: soft, non-tender, non-distended, normal bowel

 sounds





- Integumentary


Integumentary: Present: clear, warm





- Musculoskeletal


Musculoskeletal: strength equal bilaterally, generalized weakness





- Psychiatric


Psychiatric: appropriate mood/affect, cooperative





- Neurologic


Neurologic: moves all extremities





Plan


Activity: advance as tolerated, fall precautions


Diet: other (Cardiac diet)


Additional Instructions: Follow renal/hemodialysis per schedule TTS.  Advised to

 follow your private neurologist/neurosurgeon/ophthalmologist at [facility] per 

schedule.  Follow primary care physician in 1 week.  If you have worsening 

symptoms contact MD or go to the nearest emergency room as needed.  Fall 

precautions.


Follow up with: 


YVONNE PARSONS MD [Primary Care Provider] - 7 Days


AMAYA PANTOJA MD [Staff Physician] - 7 Days


MONIQUE HARRELL MD [Staff Physician] - 7 Days


Forms:  AMA Form